# Patient Record
Sex: MALE | Race: WHITE | NOT HISPANIC OR LATINO
[De-identification: names, ages, dates, MRNs, and addresses within clinical notes are randomized per-mention and may not be internally consistent; named-entity substitution may affect disease eponyms.]

---

## 2019-06-20 PROBLEM — Z00.00 ENCOUNTER FOR PREVENTIVE HEALTH EXAMINATION: Status: ACTIVE | Noted: 2019-06-20

## 2020-02-19 ENCOUNTER — APPOINTMENT (OUTPATIENT)
Dept: DERMATOLOGY | Facility: CLINIC | Age: 60
End: 2020-02-19
Payer: COMMERCIAL

## 2020-02-19 PROCEDURE — 99203 OFFICE O/P NEW LOW 30 MIN: CPT

## 2021-02-24 ENCOUNTER — APPOINTMENT (OUTPATIENT)
Dept: DERMATOLOGY | Facility: CLINIC | Age: 61
End: 2021-02-24

## 2021-08-12 ENCOUNTER — APPOINTMENT (OUTPATIENT)
Dept: DERMATOLOGY | Facility: CLINIC | Age: 61
End: 2021-08-12
Payer: COMMERCIAL

## 2021-08-12 PROCEDURE — 99213 OFFICE O/P EST LOW 20 MIN: CPT

## 2022-06-19 ENCOUNTER — OUTPATIENT (OUTPATIENT)
Dept: OUTPATIENT SERVICES | Facility: HOSPITAL | Age: 62
LOS: 1 days | End: 2022-06-19
Payer: COMMERCIAL

## 2022-06-19 DIAGNOSIS — Z11.52 ENCOUNTER FOR SCREENING FOR COVID-19: ICD-10-CM

## 2022-06-19 LAB — SARS-COV-2 RNA SPEC QL NAA+PROBE: SIGNIFICANT CHANGE UP

## 2022-06-19 PROCEDURE — U0005: CPT

## 2022-06-19 PROCEDURE — C9803: CPT

## 2022-06-19 PROCEDURE — U0003: CPT

## 2022-06-22 ENCOUNTER — OUTPATIENT (OUTPATIENT)
Dept: OUTPATIENT SERVICES | Facility: HOSPITAL | Age: 62
LOS: 1 days | End: 2022-06-22
Payer: COMMERCIAL

## 2022-06-22 ENCOUNTER — TRANSCRIPTION ENCOUNTER (OUTPATIENT)
Age: 62
End: 2022-06-22

## 2022-06-22 VITALS
OXYGEN SATURATION: 97 % | DIASTOLIC BLOOD PRESSURE: 81 MMHG | RESPIRATION RATE: 18 BRPM | HEART RATE: 48 BPM | TEMPERATURE: 98 F | WEIGHT: 186.07 LBS | SYSTOLIC BLOOD PRESSURE: 138 MMHG | HEIGHT: 68 IN

## 2022-06-22 VITALS — TEMPERATURE: 98 F

## 2022-06-22 DIAGNOSIS — I25.10 ATHEROSCLEROTIC HEART DISEASE OF NATIVE CORONARY ARTERY WITHOUT ANGINA PECTORIS: ICD-10-CM

## 2022-06-22 DIAGNOSIS — Z95.5 PRESENCE OF CORONARY ANGIOPLASTY IMPLANT AND GRAFT: Chronic | ICD-10-CM

## 2022-06-22 LAB
ANION GAP SERPL CALC-SCNC: 11 MMOL/L — SIGNIFICANT CHANGE UP (ref 5–17)
BUN SERPL-MCNC: 22 MG/DL — SIGNIFICANT CHANGE UP (ref 7–23)
CALCIUM SERPL-MCNC: 10 MG/DL — SIGNIFICANT CHANGE UP (ref 8.4–10.5)
CHLORIDE SERPL-SCNC: 104 MMOL/L — SIGNIFICANT CHANGE UP (ref 96–108)
CO2 SERPL-SCNC: 27 MMOL/L — SIGNIFICANT CHANGE UP (ref 22–31)
CREAT SERPL-MCNC: 1.06 MG/DL — SIGNIFICANT CHANGE UP (ref 0.5–1.3)
EGFR: 80 ML/MIN/1.73M2 — SIGNIFICANT CHANGE UP
GLUCOSE SERPL-MCNC: 108 MG/DL — HIGH (ref 70–99)
HCT VFR BLD CALC: 42.8 % — SIGNIFICANT CHANGE UP (ref 39–50)
HGB BLD-MCNC: 14.5 G/DL — SIGNIFICANT CHANGE UP (ref 13–17)
MCHC RBC-ENTMCNC: 30.2 PG — SIGNIFICANT CHANGE UP (ref 27–34)
MCHC RBC-ENTMCNC: 33.9 GM/DL — SIGNIFICANT CHANGE UP (ref 32–36)
MCV RBC AUTO: 89.2 FL — SIGNIFICANT CHANGE UP (ref 80–100)
NRBC # BLD: 0 /100 WBCS — SIGNIFICANT CHANGE UP (ref 0–0)
PLATELET # BLD AUTO: 262 K/UL — SIGNIFICANT CHANGE UP (ref 150–400)
POTASSIUM SERPL-MCNC: 4.3 MMOL/L — SIGNIFICANT CHANGE UP (ref 3.5–5.3)
POTASSIUM SERPL-SCNC: 4.3 MMOL/L — SIGNIFICANT CHANGE UP (ref 3.5–5.3)
RBC # BLD: 4.8 M/UL — SIGNIFICANT CHANGE UP (ref 4.2–5.8)
RBC # FLD: 12.4 % — SIGNIFICANT CHANGE UP (ref 10.3–14.5)
SODIUM SERPL-SCNC: 142 MMOL/L — SIGNIFICANT CHANGE UP (ref 135–145)
WBC # BLD: 4.71 K/UL — SIGNIFICANT CHANGE UP (ref 3.8–10.5)
WBC # FLD AUTO: 4.71 K/UL — SIGNIFICANT CHANGE UP (ref 3.8–10.5)

## 2022-06-22 PROCEDURE — 77770 HDR RDNCL NTRSTL/ICAV BRCHTX: CPT

## 2022-06-22 PROCEDURE — 92974 CATH PLACE CARDIO BRACHYTX: CPT

## 2022-06-22 PROCEDURE — 93005 ELECTROCARDIOGRAM TRACING: CPT

## 2022-06-22 PROCEDURE — 93010 ELECTROCARDIOGRAM REPORT: CPT

## 2022-06-22 PROCEDURE — 77470 SPECIAL RADIATION TREATMENT: CPT | Mod: 26

## 2022-06-22 PROCEDURE — 85027 COMPLETE CBC AUTOMATED: CPT

## 2022-06-22 PROCEDURE — 92920 PRQ TRLUML C ANGIOP 1ART&/BR: CPT | Mod: RC

## 2022-06-22 PROCEDURE — 99221 1ST HOSP IP/OBS SF/LOW 40: CPT | Mod: 25

## 2022-06-22 PROCEDURE — 77470 SPECIAL RADIATION TREATMENT: CPT

## 2022-06-22 PROCEDURE — 77290 THER RAD SIMULAJ FIELD CPLX: CPT

## 2022-06-22 PROCEDURE — C1717: CPT

## 2022-06-22 PROCEDURE — C1887: CPT

## 2022-06-22 PROCEDURE — C1728: CPT

## 2022-06-22 PROCEDURE — 77770 HDR RDNCL NTRSTL/ICAV BRCHTX: CPT | Mod: 26

## 2022-06-22 PROCEDURE — 77290 THER RAD SIMULAJ FIELD CPLX: CPT | Mod: 26

## 2022-06-22 PROCEDURE — C1769: CPT

## 2022-06-22 PROCEDURE — 77263 THER RADIOLOGY TX PLNG CPLX: CPT

## 2022-06-22 PROCEDURE — 93010 ELECTROCARDIOGRAM REPORT: CPT | Mod: 77

## 2022-06-22 PROCEDURE — C1894: CPT

## 2022-06-22 PROCEDURE — 80048 BASIC METABOLIC PNL TOTAL CA: CPT

## 2022-06-22 PROCEDURE — 77316 BRACHYTX ISODOSE PLAN SIMPLE: CPT

## 2022-06-22 PROCEDURE — 77316 BRACHYTX ISODOSE PLAN SIMPLE: CPT | Mod: 26

## 2022-06-22 PROCEDURE — C1725: CPT

## 2022-06-22 PROCEDURE — 77370 RADIATION PHYSICS CONSULT: CPT

## 2022-06-22 RX ORDER — SODIUM CHLORIDE 9 MG/ML
1000 INJECTION INTRAMUSCULAR; INTRAVENOUS; SUBCUTANEOUS
Refills: 0 | Status: DISCONTINUED | OUTPATIENT
Start: 2022-06-22 | End: 2022-07-06

## 2022-06-22 RX ORDER — SODIUM CHLORIDE 9 MG/ML
250 INJECTION INTRAMUSCULAR; INTRAVENOUS; SUBCUTANEOUS ONCE
Refills: 0 | Status: COMPLETED | OUTPATIENT
Start: 2022-06-22 | End: 2022-06-22

## 2022-06-22 RX ADMIN — SODIUM CHLORIDE 85 MILLILITER(S): 9 INJECTION INTRAMUSCULAR; INTRAVENOUS; SUBCUTANEOUS at 10:45

## 2022-06-22 RX ADMIN — SODIUM CHLORIDE 500 MILLILITER(S): 9 INJECTION INTRAMUSCULAR; INTRAVENOUS; SUBCUTANEOUS at 08:29

## 2022-06-22 NOTE — H&P CARDIOLOGY - HISTORY OF PRESENT ILLNESS
This is a 62 yo male PMH former smoker, HTN, HLD, CAD s/p multiple TRIEC (most recent 5/23/22, who presents for brachytherapy.  Denies chest pain/pressure, SOB/MARY, dizziness, diaphoresis, palpitations, nausea, vomiting, peripheral edema, recent weight gain, or syncope. No implanted monitoring devices.  This is a 60 yo male PMH former smoker, HTN, HLD, CAD s/p multiple TRICE (most recent 5/23/22, ROCHELLE on CPAP who presents for brachytherapy. Pt. endorses episodes of nonradiating chest pain with SOB/MARY prior to last PCI that have resolved with stent placement. Denies dizziness, diaphoresis, palpitations, nausea, vomiting, peripheral edema, recent weight gain, or syncope. No implanted monitoring devices.

## 2022-06-22 NOTE — ASU DISCHARGE PLAN (ADULT/PEDIATRIC) - NS MD DC FALL RISK RISK
For information on Fall & Injury Prevention, visit: [email or web address] OR  [email or web address] OR  https://www.cdc.gov/steadi/patient.html

## 2022-06-22 NOTE — ASU PATIENT PROFILE, ADULT - FALL HARM RISK - UNIVERSAL INTERVENTIONS
Bed in lowest position, wheels locked, appropriate side rails in place/Call bell, personal items and telephone in reach/Instruct patient to call for assistance before getting out of bed or chair/Non-slip footwear when patient is out of bed/[place] to call system/Physically safe environment - no spills, clutter or unnecessary equipment/Purposeful Proactive Rounding/Room/bathroom lighting operational, light cord in reach

## 2022-06-22 NOTE — ASU DISCHARGE PLAN (ADULT/PEDIATRIC) - CARE PROVIDER_API CALL
Soren Dick)  Cardiovascular Disease; Interventional Cardiology  [address] [place] [address]  Phone: [phone]  Fax: [phone]  Follow Up Time: 2 weeks

## 2022-06-22 NOTE — H&P CARDIOLOGY - NSICDXPASTMEDICALHX_GEN_ALL_CORE_FT
PAST MEDICAL HISTORY:  CAD (coronary artery disease)     HLD (hyperlipidemia)     HTN (hypertension)      PAST MEDICAL HISTORY:  CAD (coronary artery disease)     HLD (hyperlipidemia)     HTN (hypertension)     ROCHELLE on CPAP

## 2022-06-23 ENCOUNTER — TRANSCRIPTION ENCOUNTER (OUTPATIENT)
Age: 62
End: 2022-06-23

## 2022-08-17 ENCOUNTER — APPOINTMENT (OUTPATIENT)
Dept: DERMATOLOGY | Facility: CLINIC | Age: 62
End: 2022-08-17

## 2022-08-17 PROBLEM — G47.33 OBSTRUCTIVE SLEEP APNEA (ADULT) (PEDIATRIC): Chronic | Status: ACTIVE | Noted: 2022-06-22

## 2022-08-17 PROBLEM — E78.5 HYPERLIPIDEMIA, UNSPECIFIED: Chronic | Status: ACTIVE | Noted: 2022-06-22

## 2022-08-17 PROBLEM — I25.10 ATHEROSCLEROTIC HEART DISEASE OF NATIVE CORONARY ARTERY WITHOUT ANGINA PECTORIS: Chronic | Status: ACTIVE | Noted: 2022-06-22

## 2022-08-17 PROBLEM — I10 ESSENTIAL (PRIMARY) HYPERTENSION: Chronic | Status: ACTIVE | Noted: 2022-06-22

## 2022-08-17 PROCEDURE — 99213 OFFICE O/P EST LOW 20 MIN: CPT

## 2022-10-10 ENCOUNTER — APPOINTMENT (OUTPATIENT)
Dept: CT IMAGING | Facility: CLINIC | Age: 62
End: 2022-10-10

## 2022-10-10 ENCOUNTER — OUTPATIENT (OUTPATIENT)
Dept: OUTPATIENT SERVICES | Facility: HOSPITAL | Age: 62
LOS: 1 days | End: 2022-10-10
Payer: COMMERCIAL

## 2022-10-10 DIAGNOSIS — Z00.8 ENCOUNTER FOR OTHER GENERAL EXAMINATION: ICD-10-CM

## 2022-10-10 DIAGNOSIS — I25.10 ATHEROSCLEROTIC HEART DISEASE OF NATIVE CORONARY ARTERY WITHOUT ANGINA PECTORIS: ICD-10-CM

## 2022-10-10 DIAGNOSIS — Z95.5 PRESENCE OF CORONARY ANGIOPLASTY IMPLANT AND GRAFT: Chronic | ICD-10-CM

## 2022-10-10 PROCEDURE — 75574 CT ANGIO HRT W/3D IMAGE: CPT | Mod: 26

## 2022-10-10 PROCEDURE — 75574 CT ANGIO HRT W/3D IMAGE: CPT

## 2022-10-31 ENCOUNTER — OUTPATIENT (OUTPATIENT)
Dept: OUTPATIENT SERVICES | Facility: HOSPITAL | Age: 62
LOS: 1 days | End: 2022-10-31
Payer: COMMERCIAL

## 2022-10-31 ENCOUNTER — APPOINTMENT (OUTPATIENT)
Dept: CARDIOTHORACIC SURGERY | Facility: CLINIC | Age: 62
End: 2022-10-31

## 2022-10-31 ENCOUNTER — NON-APPOINTMENT (OUTPATIENT)
Age: 62
End: 2022-10-31

## 2022-10-31 VITALS
BODY MASS INDEX: 28.14 KG/M2 | TEMPERATURE: 98 F | OXYGEN SATURATION: 96 % | SYSTOLIC BLOOD PRESSURE: 160 MMHG | DIASTOLIC BLOOD PRESSURE: 88 MMHG | WEIGHT: 190 LBS | RESPIRATION RATE: 16 BRPM | HEIGHT: 69 IN | HEART RATE: 58 BPM

## 2022-10-31 VITALS
HEIGHT: 69 IN | TEMPERATURE: 98 F | DIASTOLIC BLOOD PRESSURE: 88 MMHG | HEART RATE: 58 BPM | SYSTOLIC BLOOD PRESSURE: 160 MMHG | RESPIRATION RATE: 16 BRPM | WEIGHT: 190.04 LBS | OXYGEN SATURATION: 96 %

## 2022-10-31 DIAGNOSIS — G47.33 OBSTRUCTIVE SLEEP APNEA (ADULT) (PEDIATRIC): ICD-10-CM

## 2022-10-31 DIAGNOSIS — I10 ESSENTIAL (PRIMARY) HYPERTENSION: ICD-10-CM

## 2022-10-31 DIAGNOSIS — I25.10 ATHEROSCLEROTIC HEART DISEASE OF NATIVE CORONARY ARTERY W/OUT ANGINA PECTORIS: ICD-10-CM

## 2022-10-31 DIAGNOSIS — E78.5 HYPERLIPIDEMIA, UNSPECIFIED: ICD-10-CM

## 2022-10-31 DIAGNOSIS — Z95.5 PRESENCE OF CORONARY ANGIOPLASTY IMPLANT AND GRAFT: Chronic | ICD-10-CM

## 2022-10-31 DIAGNOSIS — Z99.89 OBSTRUCTIVE SLEEP APNEA (ADULT) (PEDIATRIC): ICD-10-CM

## 2022-10-31 DIAGNOSIS — I25.10 ATHEROSCLEROTIC HEART DISEASE OF NATIVE CORONARY ARTERY WITHOUT ANGINA PECTORIS: ICD-10-CM

## 2022-10-31 PROBLEM — Z82.49 FAMILY HISTORY OF CORONARY ARTERY DISEASE: Status: ACTIVE | Noted: 2022-10-31

## 2022-10-31 PROBLEM — Z87.891 FORMER SMOKER: Status: ACTIVE | Noted: 2022-10-31

## 2022-10-31 PROBLEM — Z78.9 SOCIAL ALCOHOL USE: Status: ACTIVE | Noted: 2022-10-31

## 2022-10-31 LAB
ANION GAP SERPL CALC-SCNC: 12 MMOL/L — SIGNIFICANT CHANGE UP (ref 5–17)
BLD GP AB SCN SERPL QL: NEGATIVE — SIGNIFICANT CHANGE UP
BUN SERPL-MCNC: 16 MG/DL — SIGNIFICANT CHANGE UP (ref 7–23)
CALCIUM SERPL-MCNC: 9.7 MG/DL — SIGNIFICANT CHANGE UP (ref 8.4–10.5)
CHLORIDE SERPL-SCNC: 107 MMOL/L — SIGNIFICANT CHANGE UP (ref 96–108)
CO2 SERPL-SCNC: 24 MMOL/L — SIGNIFICANT CHANGE UP (ref 22–31)
CREAT SERPL-MCNC: 0.96 MG/DL — SIGNIFICANT CHANGE UP (ref 0.5–1.3)
EGFR: 89 ML/MIN/1.73M2 — SIGNIFICANT CHANGE UP
GLUCOSE SERPL-MCNC: 89 MG/DL — SIGNIFICANT CHANGE UP (ref 70–99)
HCT VFR BLD CALC: 43.4 % — SIGNIFICANT CHANGE UP (ref 39–50)
HGB BLD-MCNC: 14.6 G/DL — SIGNIFICANT CHANGE UP (ref 13–17)
MCHC RBC-ENTMCNC: 30 PG — SIGNIFICANT CHANGE UP (ref 27–34)
MCHC RBC-ENTMCNC: 33.6 GM/DL — SIGNIFICANT CHANGE UP (ref 32–36)
MCV RBC AUTO: 89.3 FL — SIGNIFICANT CHANGE UP (ref 80–100)
NRBC # BLD: 0 /100 WBCS — SIGNIFICANT CHANGE UP (ref 0–0)
PA ADP PRP-ACNC: 103 PRU — LOW (ref 194–417)
PLATELET # BLD AUTO: 239 K/UL — SIGNIFICANT CHANGE UP (ref 150–400)
POTASSIUM SERPL-MCNC: 4.3 MMOL/L — SIGNIFICANT CHANGE UP (ref 3.5–5.3)
POTASSIUM SERPL-SCNC: 4.3 MMOL/L — SIGNIFICANT CHANGE UP (ref 3.5–5.3)
RBC # BLD: 4.86 M/UL — SIGNIFICANT CHANGE UP (ref 4.2–5.8)
RBC # FLD: 12.2 % — SIGNIFICANT CHANGE UP (ref 10.3–14.5)
RH IG SCN BLD-IMP: NEGATIVE — SIGNIFICANT CHANGE UP
SARS-COV-2 RNA SPEC QL NAA+PROBE: SIGNIFICANT CHANGE UP
SODIUM SERPL-SCNC: 143 MMOL/L — SIGNIFICANT CHANGE UP (ref 135–145)
WBC # BLD: 5.38 K/UL — SIGNIFICANT CHANGE UP (ref 3.8–10.5)
WBC # FLD AUTO: 5.38 K/UL — SIGNIFICANT CHANGE UP (ref 3.8–10.5)

## 2022-10-31 PROCEDURE — 86850 RBC ANTIBODY SCREEN: CPT

## 2022-10-31 PROCEDURE — 86901 BLOOD TYPING SEROLOGIC RH(D): CPT

## 2022-10-31 PROCEDURE — 99244 OFF/OP CNSLTJ NEW/EST MOD 40: CPT

## 2022-10-31 PROCEDURE — 93880 EXTRACRANIAL BILAT STUDY: CPT

## 2022-10-31 PROCEDURE — G0463: CPT

## 2022-10-31 PROCEDURE — 71046 X-RAY EXAM CHEST 2 VIEWS: CPT | Mod: 26

## 2022-10-31 PROCEDURE — 86900 BLOOD TYPING SEROLOGIC ABO: CPT

## 2022-10-31 PROCEDURE — 93880 EXTRACRANIAL BILAT STUDY: CPT | Mod: 26

## 2022-10-31 PROCEDURE — 71046 X-RAY EXAM CHEST 2 VIEWS: CPT

## 2022-10-31 RX ORDER — CEFAZOLIN SODIUM 1 G
2000 VIAL (EA) INJECTION ONCE
Refills: 0 | Status: COMPLETED | OUTPATIENT
Start: 2022-10-31 | End: 2022-10-31

## 2022-10-31 RX ORDER — ROSUVASTATIN CALCIUM 40 MG/1
40 TABLET, FILM COATED ORAL
Qty: 30 | Refills: 5 | Status: ACTIVE | COMMUNITY

## 2022-10-31 RX ORDER — LIDOCAINE HCL 20 MG/ML
0.2 VIAL (ML) INJECTION ONCE
Refills: 0 | Status: DISCONTINUED | OUTPATIENT
Start: 2022-10-31 | End: 2022-11-15

## 2022-10-31 RX ORDER — ICOSAPENT ETHYL 1000 MG/1
1 CAPSULE ORAL
Qty: 360 | Refills: 3 | Status: ACTIVE | COMMUNITY

## 2022-10-31 RX ORDER — BEMPEDOIC ACID 180 MG/1
180 TABLET, FILM COATED ORAL DAILY
Refills: 0 | Status: ACTIVE | COMMUNITY

## 2022-10-31 RX ORDER — SODIUM CHLORIDE 9 MG/ML
3 INJECTION INTRAMUSCULAR; INTRAVENOUS; SUBCUTANEOUS EVERY 8 HOURS
Refills: 0 | Status: DISCONTINUED | OUTPATIENT
Start: 2022-10-31 | End: 2022-11-15

## 2022-10-31 RX ORDER — ACETAMINOPHEN 500 MG
1000 TABLET ORAL ONCE
Refills: 0 | Status: DISCONTINUED | OUTPATIENT
Start: 2022-10-31 | End: 2022-11-15

## 2022-10-31 RX ORDER — ASPIRIN ENTERIC COATED TABLETS 81 MG 81 MG/1
81 TABLET, DELAYED RELEASE ORAL
Qty: 30 | Refills: 0 | Status: ACTIVE | COMMUNITY

## 2022-10-31 NOTE — H&P PST ADULT - HISTORY OF PRESENT ILLNESS
Mr. SARAVIA is a 62 year old male with past medical history of former smoker, HTN, HLD, Low back pain, CAD s/p multiple TRICE (most recent 5/23/22, Had brachytherapy to mid RCA on 6/22/22 ), ROCHELLE on CPAP with complaints of chest pain radiating to bilateral shoulder, occasional shortness of breath and occasional dizziness . Pt. endorses episodes of nonradiating chest pain with SOB/MARY prior to last PCI that have resolved with stent placement but did not feel better after the brachytherapy . He reports that his chest pain is with activities and needs to take rest to resolve the pain. His last chest pain was last week pretty much every day with less intensity. His recent 10/28/22 Cardiac Catheterization revealed LVEF 55%, LT heart catheterization demonstrated severe in stent restenosis of the LAD and the diagonal branches. LAD 90% stenosis and previously placed stent is a drug-eluting stent and is partially occluded. First diagonal 90% stenosis and previously placed stent is a drug-eluting stent and is partially occluded. There is mild non obstructive disease of the LCx. The RCA has moderate in stent restenosis of the mid RCA which recently had brachytherapy. There is 60% stenosis. He is referred by Dr.Ralph Keys with initial evaluation and management for CAD. He is scheduled for coronary Artery Bypass grafting with Dr. Danny Alvarado on 11/3/22.

## 2022-10-31 NOTE — H&P PST ADULT - NSICDXPASTMEDICALHX_GEN_ALL_CORE_FT
PAST MEDICAL HISTORY:  CAD (coronary artery disease)     HLD (hyperlipidemia)     HTN (hypertension)     ROCHELLE on CPAP

## 2022-10-31 NOTE — END OF VISIT
[FreeTextEntry3] : \par I personally scribed for AMINTA SWEET on Oct 31 2022 12:30PM . \par \par \par \par \par Physician Attestation:\par Documented by ALISON MONTES acting as a scribe for AMINTA SWEET 10/31/2022 . \par                 All medical record entries made by the Scribe were at my, AMINTA SWEET , direction and personally dictated by me on 10/31/2022 . I have reviewed the chart and agree that the record accurately reflects my personal performance of the history, physical exam, assessment and plan\par \par

## 2022-10-31 NOTE — PHYSICAL EXAM
[General Appearance - Alert] : alert [General Appearance - In No Acute Distress] : in no acute distress [General Appearance - Well Nourished] : well nourished [General Appearance - Well Developed] : well developed [Sclera] : the sclera and conjunctiva were normal [Outer Ear] : the ears and nose were normal in appearance [PERRL With Normal Accommodation] : pupils were equal in size, round, and reactive to light [Both Tympanic Membranes Were Examined] : both tympanic membranes were normal [Neck Appearance] : the appearance of the neck was normal [] : no respiratory distress [Respiration, Rhythm And Depth] : normal respiratory rhythm and effort [Auscultation Breath Sounds / Voice Sounds] : lungs were clear to auscultation bilaterally [Apical Impulse] : the apical impulse was normal [Heart Rate And Rhythm] : heart rate was normal and rhythm regular [Heart Sounds] : normal S1 and S2 [Murmurs] : no murmurs [Examination Of The Chest] : the chest was normal in appearance [2+] : left 2+ [Breast Appearance] : normal in appearance [Bowel Sounds] : normal bowel sounds [Abdomen Soft] : soft [No CVA Tenderness] : no ~M costovertebral angle tenderness [Abnormal Walk] : normal gait [Involuntary Movements] : no involuntary movements were seen [Skin Color & Pigmentation] : normal skin color and pigmentation [Skin Turgor] : normal skin turgor [No Focal Deficits] : no focal deficits [Oriented To Time, Place, And Person] : oriented to person, place, and time [Impaired Insight] : insight and judgment were intact [Affect] : the affect was normal [Mood] : the mood was normal [Memory Recent] : recent memory was not impaired [Memory Remote] : remote memory was not impaired [FreeTextEntry1] : Deferred

## 2022-10-31 NOTE — H&P PST ADULT - ASSESSMENT
Mr. SARAVIA is a 62 year old male with past medical history of former smoker, HTN, HLD, Low back pain, CAD s/p multiple TRICE (most recent 5/23/22, Had brachytherapy to mid RCA on 6/22/22 ), ROCHELLE on CPAP with complaints of chest pain radiating to bilateral shoulder, occasional shortness of breath and occasional dizziness . Pt. endorses episodes of nonradiating chest pain with SOB/MARY prior to last PCI that have resolved with stent placement but did not feel better after the brachytherapy . He reports that his chest pain is with activities and needs to take rest to resolve the pain. His last chest pain was last week pretty much every day with less intensity. His recent 10/28/22 Cardiac Catheterization revealed LVEF 55%, LT heart catheterization demonstrated severe in stent restenosis of the LAD and the diagonal branches. LAD 90% stenosis and previously placed stent is a drug-eluting stent and is partially occluded. First diagonal 90% stenosis and previously placed stent is a drug-eluting stent and is partially occluded. There is mild non obstructive disease of the LCx. The RCA has moderate in stent restenosis of the mid RCA which recently had brachytherapy. There is 60% stenosis.

## 2022-10-31 NOTE — HISTORY OF PRESENT ILLNESS
[FreeTextEntry1] : Mr. SARAVIA is a 62 year old male with  past medical history of former smoker, HTN, HLD, Low back pain, CAD s/p multiple TRICE (most recent 22, Had brachytherapy to mid RCA on 22  ), ROCHELLE on CPAP with complaints of chest pain radiating to bilateral shoulder, occasional shortness of breath and occasional dizziness .  Pt. endorses episodes of nonradiating chest pain with SOB/MARY prior to last PCI that have resolved with stent placement but did not feel better after the brachytherapy . He reports that his chest pain is with activities and needs to take rest to resolve the pain. His last chest pain was last week pretty much every day with less intensity. His recent 10/28/22 Cardiac Catheterization revealed LVEF 55%, LT heart catheterization demonstrated severe in stent restenosis of the LAD and the diagonal branches. LAD 90% stenosis and previously placed stent is a drug-eluting stent and is partially occluded. First diagonal 90% stenosis and previously placed stent is a drug-eluting stent and is partially occluded.  There is mild non obstructive disease of the LCx. The RCA has moderate in stent restenosis of the mid RCA which recently had brachytherapy.  There is 60% stenosis. He is referred by Dr.Ralph Keys with initial evaluation and management for CAD. Denies any syncope, pedal edema. \par \par Family history : Father  in sleep unknown reason at the age of 65 ? heart attack \par \par \par

## 2022-10-31 NOTE — CONSULT LETTER
[FreeTextEntry2] : Dr.Ralph Keys [FreeTextEntry1] : I had the pleasure of seeing your patient, RENETTA SARAVIA,in my office today. \par \par We take a multidisciplinary team approach to patient care and consider you, the referring physician, an extension of our team. We will maintain an open line of communication with you throughout your patient's treatment course. \par \par He  is being evaluated for CAD . I have reviewed all of the patient's medical records and diagnostic images at the time of his  office consultation. I have enclosed a copy for your records. \par \par 10/28/22 Cardiac Catheterization revealed LVEF 55%, LT heart catheterization demonstrated severe in stent restenosis of the LAD and the diagonal branches. LAD 90% stenosis and previously placed stent is a drug-eluting stent and is partially occluded. First diagonal 90% stenosis and previously placed stent is a drug-eluting stent and is partially occluded.  There is mild non obstructive disease of the LCx. The RCA has moderate in stent restenosis of the mid RCA which recently had brachytherapy.  There is 60% stenosis. \par \par 10/10/22 CCTA revealed The LM coronary artery has mild (<30%) calcified plaque. Focal region of low attenuation noted at the origin of the stent in the proximal LAD compatible with severe in-stent restenosis. The LAD has mild (30-49%) noncalcified plaque in the mid segment and mild (30-49%) noncalcified and calcified plaque in the distal segment. Myocardial bridging noted in the mid vessel. The distal LAD wraps around the left ventricular apex. Calcified plaque is present in a very small-caliber first diagonal branch. Severe in-stent restenosis noted in the stented (proximal vessel) second diagonal branch. Severe mixed noncalcified and calcified plaque is present in the second diagonal branch just distal to the stent. Distal to the obstructive plaque in the second diagonal branch no significant luminal stenosis noted.\par - The LCX coronary artery has mild (30-49%) noncalcified and calcified plaque in the proximal segment. The stent extending from the proximal segment to the obtuse marginal (OM) branch appears patent. The distal LCX has minimal (<30%) noncalcified and calcified plaque. The OM branch distal to the stent has minimal (<30%) noncalcified plaque.\par - The long stent extending from the proximal RCA to the distal segment appears patent. The distal vessel (distal to the stent) has mild (30-49%) noncalcified and calcified plaque. The small right posterior descending artery (PDA) has minimal (<30%) noncalcified plaque. A small right posterolateral (RPL) branch is present.\par - Aorta:Minimal noncalcified and calcified plaque is present in the visualized thoracic aorta.There is a suspected patent foramen ovale with a very small volume of left-to-right flow of contrast agent.\par - Non-cardiac: Few very small lymph nodes are present in the hilar regions bilaterally. Patchy ground glass opacities in the posterior aspects of both lower lobes represent dependent atelectasis. Visualized osseous structures demonstrate degenerative changes of the spine.\par \par 6/22/22 Cardiac catheterization revealed  Mid right coronary artery: There is a 70 % stenosis.  \par Interventional Findings: \par A successful Balloon angioplasty was performed . A successful Beta Brachytherapy was performed. \par \par I have reviewed the indications for surgery and anatomy of the heart. The patient meets criteria for surgery. I have recommended that the patient is a candidate for a Coronary Artery Bypass grafting . \par \par Surgery is scheduled for Coronary Artery Bypass grafting on 11/3/22 .  I will update you on his perioperative status and disposition upon discharge. \par \par I appreciate the opportunity to care for your patient at the  [facility] based at [place]. If there are any questions or concerns, please call me  at [phone].\par \par Please see my note below. \par \par Sincerely, \par \par \par \par \par Danny Alvarado MD\par Director\par [facility]\par Professor \par Cardiovascular & Thoracic Surgery\par [facility]\[employer].\par \par \par [facility]:\par Department of Cardiovascular and Thoracic Surgery\[address], [address]\par Office: [phone]\par Fax: [phone]\par \par Shannen Schwartz\par  \par Department of Cardiovascular and Thoracic Surgery\[address], [address]\par Phone: [phone]\par Office: [phone]\par \par \par \par \par \par

## 2022-10-31 NOTE — H&P PST ADULT - FALL HARM RISK - UNIVERSAL INTERVENTIONS
Bed in lowest position, wheels locked, appropriate side rails in place/Call bell, personal items and telephone in reach/Instruct patient to call for assistance before getting out of bed or chair/Non-slip footwear when patient is out of bed/[place] to call system/Physically safe environment - no spills, clutter or unnecessary equipment/Purposeful Proactive Rounding/Room/bathroom lighting operational, light cord in reach

## 2022-10-31 NOTE — H&P PST ADULT - PROBLEM SELECTOR PLAN 1
For CABG on 11/3/22 with Dr. Danny Alvarado    BB: On Bystolic 2.5 mg on the AM surgery    Stop Plavix today    Continue Aspirin

## 2022-10-31 NOTE — REVIEW OF SYSTEMS
[Chest Pain] : chest pain [Shortness Of Breath] : shortness of breath [SOB on Exertion] : shortness of breath during exertion [Dizziness] : dizziness [Negative] : Heme/Lymph

## 2022-10-31 NOTE — ASSESSMENT
[FreeTextEntry1] : Mr. SARAVIA is a 62 year old male with  past medical history of former smoker, HTN, HLD, Low back pain, CAD s/p multiple TRICE (most recent 5/23/22, Had brachytherapy to mid RCA on 6/22/22  ), ROCHELLE on CPAP with complaints of chest pain radiating to bilateral shoulder, occasional shortness of breath and occasional dizziness .  Pt. endorses episodes of nonradiating chest pain with SOB/MARY prior to last PCI that have resolved with stent placement but did not feel better after the brachytherapy . He reports that his chest pain is with activities and needs to take rest to resolve the pain. His last chest pain was last week pretty much every day with less intensity. His recent 10/28/22 Cardiac Catheterization revealed LVEF 55%, LT heart catheterization demonstrated severe in stent restenosis of the LAD and the diagonal branches. LAD 90% stenosis and previously placed stent is a drug-eluting stent and is partially occluded. First diagonal 90% stenosis and previously placed stent is a drug-eluting stent and is partially occluded.  There is mild non obstructive disease of the LCx. The RCA has moderate in stent restenosis of the mid RCA which recently had brachytherapy.  There is 60% stenosis. He is referred by Dr.Ralph Keys with initial evaluation and management for CAD. Denies any syncope, pedal edema. \par \par \par  Dr.James Alvarado reviewed the cardiac imaging, medical records and reports with patient and discussed the case.10/28/22 Cardiac Catheterization revealed LVEF 55%, LT heart catheterization demonstrated severe in stent restenosis of the LAD and the diagonal branches. LAD 90% stenosis and previously placed stent is a drug-eluting stent and is partially occluded. First diagonal 90% stenosis and previously placed stent is a drug-eluting stent and is partially occluded.  There is mild non obstructive disease of the LCx. The RCA has moderate in stent restenosis of the mid RCA which recently had brachytherapy.  There is 60% stenosis. \par \par 10/10/22 CCTA revealed The LM coronary artery has mild (<30%) calcified plaque. Focal region of low attenuation noted at the origin of the stent in the proximal LAD compatible with severe in-stent restenosis. The LAD has mild (30-49%) noncalcified plaque in the mid segment and mild (30-49%) noncalcified and calcified plaque in the distal segment. Myocardial bridging noted in the mid vessel. The distal LAD wraps around the left ventricular apex. Calcified plaque is present in a very small-caliber first diagonal branch. Severe in-stent restenosis noted in the stented (proximal vessel) second diagonal branch. Severe mixed noncalcified and calcified plaque is present in the second diagonal branch just distal to the stent. Distal to the obstructive plaque in the second diagonal branch no significant luminal stenosis noted.\par - The LCX coronary artery has mild (30-49%) noncalcified and calcified plaque in the proximal segment. The stent extending from the proximal segment to the obtuse marginal (OM) branch appears patent. The distal LCX has minimal (<30%) noncalcified and calcified plaque. The OM branch distal to the stent has minimal (<30%) noncalcified plaque.\par - The long stent extending from the proximal RCA to the distal segment appears patent. The distal vessel (distal to the stent) has mild (30-49%) noncalcified and calcified plaque. The small right posterior descending artery (PDA) has minimal (<30%) noncalcified plaque. A small right posterolateral (RPL) branch is present.\par - Aorta:Minimal noncalcified and calcified plaque is present in the visualized thoracic aorta.There is a suspected patent foramen ovale with a very small volume of left-to-right flow of contrast agent.\par - Non-cardiac: Few very small lymph nodes are present in the hilar regions bilaterally. Patchy ground glass opacities in the posterior aspects of both lower lobes represent dependent atelectasis. Visualized osseous structures demonstrate degenerative changes of the spine.\par \par 6/22/22 Cardiac catheterization revealed  Mid right coronary artery: There is a 70 % stenosis.  \par Interventional Findings: \par A successful Balloon angioplasty was performed . A successful Beta Brachytherapy was performed. \par \par    Dr.James Alvarado discussed the risks , benefits and alternatives to surgery. Risks included but not limited to  bleeding , stroke, Myocardial Infarction, kidney problems,Blood transfusion ,permanent  pacemaker implantation,  infections and death.  Dr.James Alvarado quoted a (low ) operative mortality and complication risks. Dr.James Alvarado also discussed the various approaches in detail. Dr.James Alvarado feel that the patient will benefit and is a candidate for a Coronary Artery Bypass grafting  . All questions and concerns were addressed and patient agrees to proceed with  surgery on 11/3/22.\par \par \par Plan:\par 1) Coronary Artery Bypass grafting on 11/3/22 \par 2) PST \par 3) Stop Plavix today and continue Aspirin \par 4) May return to clinic on PRN basis \par \par ADDENDUM--62 yr old male with coronary stent restenosis and symptoms of exertional chest discomfort and dyspnea.  Plan CABG on 11/3/22.  Will continue ASA into surgery.

## 2022-10-31 NOTE — DATA REVIEWED
[FreeTextEntry1] : 10/28/22 Cardiac Catheterization revealed LVEF 55%, LT heart catheterization demonstrated severe in stent restenosis of the LAD and the diagonal branches. LAD 90% stenosis and previously placed stent is a drug-eluting stent and is partially occluded. First diagonal 90% stenosis and previously placed stent is a drug-eluting stent and is partially occluded.  There is mild non obstructive disease of the LCx. The RCA has moderate in stent restenosis of the mid RCA which recently had brachytherapy.  There is 60% stenosis. \par \par 10/10/22 CCTA revealed The LM coronary artery has mild (<30%) calcified plaque. Focal region of low attenuation noted at the origin of the stent in the proximal LAD compatible with severe in-stent restenosis. The LAD has mild (30-49%) noncalcified plaque in the mid segment and mild (30-49%) noncalcified and calcified plaque in the distal segment. Myocardial bridging noted in the mid vessel. The distal LAD wraps around the left ventricular apex. Calcified plaque is present in a very small-caliber first diagonal branch. Severe in-stent restenosis noted in the stented (proximal vessel) second diagonal branch. Severe mixed noncalcified and calcified plaque is present in the second diagonal branch just distal to the stent. Distal to the obstructive plaque in the second diagonal branch no significant luminal stenosis noted.\par - The LCX coronary artery has mild (30-49%) noncalcified and calcified plaque in the proximal segment. The stent extending from the proximal segment to the obtuse marginal (OM) branch appears patent. The distal LCX has minimal (<30%) noncalcified and calcified plaque. The OM branch distal to the stent has minimal (<30%) noncalcified plaque.\par - The long stent extending from the proximal RCA to the distal segment appears patent. The distal vessel (distal to the stent) has mild (30-49%) noncalcified and calcified plaque. The small right posterior descending artery (PDA) has minimal (<30%) noncalcified plaque. A small right posterolateral (RPL) branch is present.\par - Aorta:Minimal noncalcified and calcified plaque is present in the visualized thoracic aorta.There is a suspected patent foramen ovale with a very small volume of left-to-right flow of contrast agent.\par - Non-cardiac: Few very small lymph nodes are present in the hilar regions bilaterally. Patchy ground glass opacities in the posterior aspects of both lower lobes represent dependent atelectasis. Visualized osseous structures demonstrate degenerative changes of the spine.\par \par 6/22/22 Cardiac catheterization revealed  Mid right coronary artery: There is a 70 % stenosis.  \par Interventional Findings: \par A successful Balloon angioplasty was performed . A successful Beta Brachytherapy was performed. \par \par \par

## 2022-11-01 ENCOUNTER — APPOINTMENT (OUTPATIENT)
Dept: CARDIOTHORACIC SURGERY | Facility: CLINIC | Age: 62
End: 2022-11-01

## 2022-11-01 DIAGNOSIS — Z87.891 PERSONAL HISTORY OF NICOTINE DEPENDENCE: ICD-10-CM

## 2022-11-01 DIAGNOSIS — Z78.9 OTHER SPECIFIED HEALTH STATUS: ICD-10-CM

## 2022-11-01 DIAGNOSIS — I10 ESSENTIAL (PRIMARY) HYPERTENSION: ICD-10-CM

## 2022-11-01 DIAGNOSIS — Z82.49 FAMILY HISTORY OF ISCHEMIC HEART DISEASE AND OTHER DISEASES OF THE CIRCULATORY SYSTEM: ICD-10-CM

## 2022-11-01 LAB
A1C WITH ESTIMATED AVERAGE GLUCOSE RESULT: 5.8 % — HIGH (ref 4–5.6)
ESTIMATED AVERAGE GLUCOSE: 120 MG/DL — HIGH (ref 68–114)
MRSA PCR RESULT.: SIGNIFICANT CHANGE UP
S AUREUS DNA NOSE QL NAA+PROBE: SIGNIFICANT CHANGE UP

## 2022-11-03 ENCOUNTER — INPATIENT (INPATIENT)
Facility: HOSPITAL | Age: 62
LOS: 4 days | Discharge: HOME | DRG: 235 | End: 2022-11-08
Attending: THORACIC SURGERY (CARDIOTHORACIC VASCULAR SURGERY) | Admitting: THORACIC SURGERY (CARDIOTHORACIC VASCULAR SURGERY)
Payer: COMMERCIAL

## 2022-11-03 ENCOUNTER — APPOINTMENT (OUTPATIENT)
Dept: CARDIOTHORACIC SURGERY | Facility: HOSPITAL | Age: 62
End: 2022-11-03

## 2022-11-03 VITALS
SYSTOLIC BLOOD PRESSURE: 137 MMHG | TEMPERATURE: 98 F | DIASTOLIC BLOOD PRESSURE: 83 MMHG | OXYGEN SATURATION: 97 % | WEIGHT: 193.79 LBS | RESPIRATION RATE: 16 BRPM | HEIGHT: 69 IN | HEART RATE: 63 BPM

## 2022-11-03 DIAGNOSIS — Z95.5 PRESENCE OF CORONARY ANGIOPLASTY IMPLANT AND GRAFT: Chronic | ICD-10-CM

## 2022-11-03 DIAGNOSIS — I25.10 ATHEROSCLEROTIC HEART DISEASE OF NATIVE CORONARY ARTERY WITHOUT ANGINA PECTORIS: ICD-10-CM

## 2022-11-03 LAB
ALBUMIN SERPL ELPH-MCNC: 4 G/DL — SIGNIFICANT CHANGE UP (ref 3.3–5)
ALP SERPL-CCNC: 33 U/L — LOW (ref 40–120)
ALT FLD-CCNC: 12 U/L — SIGNIFICANT CHANGE UP (ref 10–45)
ANION GAP SERPL CALC-SCNC: 16 MMOL/L — SIGNIFICANT CHANGE UP (ref 5–17)
APTT BLD: 32.5 SEC — SIGNIFICANT CHANGE UP (ref 27.5–35.5)
AST SERPL-CCNC: 27 U/L — SIGNIFICANT CHANGE UP (ref 10–40)
BASE EXCESS BLDV CALC-SCNC: -0.2 MMOL/L — SIGNIFICANT CHANGE UP (ref -2–3)
BASE EXCESS BLDV CALC-SCNC: -1.8 MMOL/L — SIGNIFICANT CHANGE UP (ref -2–3)
BASE EXCESS BLDV CALC-SCNC: -3.4 MMOL/L — LOW (ref -2–3)
BASOPHILS # BLD AUTO: 0.06 K/UL — SIGNIFICANT CHANGE UP (ref 0–0.2)
BASOPHILS NFR BLD AUTO: 0.4 % — SIGNIFICANT CHANGE UP (ref 0–2)
BILIRUB SERPL-MCNC: 0.9 MG/DL — SIGNIFICANT CHANGE UP (ref 0.2–1.2)
BLD GP AB SCN SERPL QL: NEGATIVE — SIGNIFICANT CHANGE UP
BLOOD GAS VENOUS - CREATININE: SIGNIFICANT CHANGE UP MG/DL (ref 0.5–1.3)
BUN SERPL-MCNC: 14 MG/DL — SIGNIFICANT CHANGE UP (ref 7–23)
CA-I SERPL-SCNC: 0.98 MMOL/L — LOW (ref 1.15–1.33)
CA-I SERPL-SCNC: 1.05 MMOL/L — LOW (ref 1.15–1.33)
CA-I SERPL-SCNC: 1.05 MMOL/L — LOW (ref 1.15–1.33)
CALCIUM SERPL-MCNC: 8.2 MG/DL — LOW (ref 8.4–10.5)
CHLORIDE BLDV-SCNC: 102 MMOL/L — SIGNIFICANT CHANGE UP (ref 96–108)
CHLORIDE BLDV-SCNC: 104 MMOL/L — SIGNIFICANT CHANGE UP (ref 96–108)
CHLORIDE BLDV-SCNC: 105 MMOL/L — SIGNIFICANT CHANGE UP (ref 96–108)
CHLORIDE SERPL-SCNC: 104 MMOL/L — SIGNIFICANT CHANGE UP (ref 96–108)
CK MB BLD-MCNC: 10.6 % — HIGH (ref 0–3.5)
CK MB CFR SERPL CALC: 32.2 NG/ML — HIGH (ref 0–6.7)
CK SERPL-CCNC: 303 U/L — HIGH (ref 30–200)
CO2 BLDV-SCNC: 24 MMOL/L — SIGNIFICANT CHANGE UP (ref 22–26)
CO2 BLDV-SCNC: 26 MMOL/L — SIGNIFICANT CHANGE UP (ref 22–26)
CO2 BLDV-SCNC: 26 MMOL/L — SIGNIFICANT CHANGE UP (ref 22–26)
CO2 SERPL-SCNC: 22 MMOL/L — SIGNIFICANT CHANGE UP (ref 22–31)
CREAT SERPL-MCNC: 0.84 MG/DL — SIGNIFICANT CHANGE UP (ref 0.5–1.3)
EGFR: 99 ML/MIN/1.73M2 — SIGNIFICANT CHANGE UP
EOSINOPHIL # BLD AUTO: 0.05 K/UL — SIGNIFICANT CHANGE UP (ref 0–0.5)
EOSINOPHIL NFR BLD AUTO: 0.3 % — SIGNIFICANT CHANGE UP (ref 0–6)
FIBRINOGEN PPP-MCNC: 259 MG/DL — LOW (ref 330–520)
GAS PNL BLDA: SIGNIFICANT CHANGE UP
GAS PNL BLDV: 133 MMOL/L — LOW (ref 136–145)
GAS PNL BLDV: 135 MMOL/L — LOW (ref 136–145)
GAS PNL BLDV: 135 MMOL/L — LOW (ref 136–145)
GAS PNL BLDV: SIGNIFICANT CHANGE UP
GLUCOSE BLDC GLUCOMTR-MCNC: 179 MG/DL — HIGH (ref 70–99)
GLUCOSE BLDC GLUCOMTR-MCNC: 96 MG/DL — SIGNIFICANT CHANGE UP (ref 70–99)
GLUCOSE BLDV-MCNC: 110 MG/DL — HIGH (ref 70–99)
GLUCOSE BLDV-MCNC: 120 MG/DL — HIGH (ref 70–99)
GLUCOSE BLDV-MCNC: 125 MG/DL — HIGH (ref 70–99)
GLUCOSE SERPL-MCNC: 174 MG/DL — HIGH (ref 70–99)
HCO3 BLDV-SCNC: 23 MMOL/L — SIGNIFICANT CHANGE UP (ref 22–29)
HCO3 BLDV-SCNC: 24 MMOL/L — SIGNIFICANT CHANGE UP (ref 22–29)
HCO3 BLDV-SCNC: 25 MMOL/L — SIGNIFICANT CHANGE UP (ref 22–29)
HCT VFR BLD CALC: 35.4 % — LOW (ref 39–50)
HCT VFR BLDA CALC: 29 % — LOW (ref 39–51)
HCT VFR BLDA CALC: 30 % — LOW (ref 39–51)
HCT VFR BLDA CALC: 30 % — LOW (ref 39–51)
HEPARINASE TEG R TIME: 7.3 MIN — SIGNIFICANT CHANGE UP (ref 4.3–8.3)
HEPARINASE TEG R TIME: 8.8 MIN (ref 4.3–8.3)
HGB BLD CALC-MCNC: 10 G/DL — LOW (ref 12.6–17.4)
HGB BLD CALC-MCNC: 10.1 G/DL — LOW (ref 12.6–17.4)
HGB BLD CALC-MCNC: 9.8 G/DL — LOW (ref 12.6–17.4)
HGB BLD-MCNC: 12.3 G/DL — LOW (ref 13–17)
IMM GRANULOCYTES NFR BLD AUTO: 3.3 % — HIGH (ref 0–0.9)
INR BLD: 1.28 RATIO — HIGH (ref 0.88–1.16)
LACTATE BLDV-MCNC: 1.1 MMOL/L — SIGNIFICANT CHANGE UP (ref 0.5–2)
LACTATE BLDV-MCNC: 1.6 MMOL/L — SIGNIFICANT CHANGE UP (ref 0.5–2)
LACTATE BLDV-MCNC: 1.7 MMOL/L — SIGNIFICANT CHANGE UP (ref 0.5–2)
LYMPHOCYTES # BLD AUTO: 1.09 K/UL — SIGNIFICANT CHANGE UP (ref 1–3.3)
LYMPHOCYTES # BLD AUTO: 6.8 % — LOW (ref 13–44)
MAGNESIUM SERPL-MCNC: 2.6 MG/DL — SIGNIFICANT CHANGE UP (ref 1.6–2.6)
MANUAL SMEAR VERIFICATION: SIGNIFICANT CHANGE UP
MCHC RBC-ENTMCNC: 30.4 PG — SIGNIFICANT CHANGE UP (ref 27–34)
MCHC RBC-ENTMCNC: 34.7 GM/DL — SIGNIFICANT CHANGE UP (ref 32–36)
MCV RBC AUTO: 87.6 FL — SIGNIFICANT CHANGE UP (ref 80–100)
MONOCYTES # BLD AUTO: 0.58 K/UL — SIGNIFICANT CHANGE UP (ref 0–0.9)
MONOCYTES NFR BLD AUTO: 3.6 % — SIGNIFICANT CHANGE UP (ref 2–14)
NEUTROPHILS # BLD AUTO: 13.64 K/UL — HIGH (ref 1.8–7.4)
NEUTROPHILS NFR BLD AUTO: 85.6 % — HIGH (ref 43–77)
NRBC # BLD: 0 /100 WBCS — SIGNIFICANT CHANGE UP (ref 0–0)
PCO2 BLDV: 41 MMHG — LOW (ref 42–55)
PCO2 BLDV: 46 MMHG — SIGNIFICANT CHANGE UP (ref 42–55)
PCO2 BLDV: 47 MMHG — SIGNIFICANT CHANGE UP (ref 42–55)
PH BLDV: 7.3 — LOW (ref 7.32–7.43)
PH BLDV: 7.33 — SIGNIFICANT CHANGE UP (ref 7.32–7.43)
PH BLDV: 7.39 — SIGNIFICANT CHANGE UP (ref 7.32–7.43)
PHOSPHATE SERPL-MCNC: 3.3 MG/DL — SIGNIFICANT CHANGE UP (ref 2.5–4.5)
PLAT MORPH BLD: NORMAL — SIGNIFICANT CHANGE UP
PLATELET # BLD AUTO: 236 K/UL — SIGNIFICANT CHANGE UP (ref 150–400)
PLATELET MAPPING ACTF MAX AMPLITUDE: 8.4 MM — SIGNIFICANT CHANGE UP (ref 2–19)
PLATELET MAPPING ADP MAXIMUM AMPLITUDE: 49 MM — SIGNIFICANT CHANGE UP (ref 45–69)
PLATELET MAPPING ADP PERCENT INHIBITION: 25 % (ref 0–17)
PLATELET MAPPING HKH MAXIMUM AMPLITUDE: 62.5 MM — SIGNIFICANT CHANGE UP (ref 53–68)
PO2 BLDV: 53 MMHG — HIGH (ref 25–45)
PO2 BLDV: 78 MMHG — HIGH (ref 25–45)
PO2 BLDV: 96 MMHG — HIGH (ref 25–45)
POTASSIUM BLDV-SCNC: 4.5 MMOL/L — SIGNIFICANT CHANGE UP (ref 3.5–5.1)
POTASSIUM BLDV-SCNC: 5 MMOL/L — SIGNIFICANT CHANGE UP (ref 3.5–5.1)
POTASSIUM BLDV-SCNC: 5.9 MMOL/L — HIGH (ref 3.5–5.1)
POTASSIUM SERPL-MCNC: 4.6 MMOL/L — SIGNIFICANT CHANGE UP (ref 3.5–5.3)
POTASSIUM SERPL-SCNC: 4.6 MMOL/L — SIGNIFICANT CHANGE UP (ref 3.5–5.3)
PROT SERPL-MCNC: 5.7 G/DL — LOW (ref 6–8.3)
PROTHROM AB SERPL-ACNC: 14.9 SEC — HIGH (ref 10.5–13.4)
RAPIDTEG MAXIMUM AMPLITUDE: 54 MM — SIGNIFICANT CHANGE UP (ref 52–70)
RAPIDTEG MAXIMUM AMPLITUDE: 60.8 MM — SIGNIFICANT CHANGE UP (ref 52–70)
RBC # BLD: 4.04 M/UL — LOW (ref 4.2–5.8)
RBC # FLD: 11.9 % — SIGNIFICANT CHANGE UP (ref 10.3–14.5)
RBC BLD AUTO: SIGNIFICANT CHANGE UP
RH IG SCN BLD-IMP: NEGATIVE — SIGNIFICANT CHANGE UP
SAO2 % BLDV: 88.7 % — HIGH (ref 67–88)
SAO2 % BLDV: 96.5 % — HIGH (ref 67–88)
SAO2 % BLDV: 98.6 % — HIGH (ref 67–88)
SODIUM SERPL-SCNC: 142 MMOL/L — SIGNIFICANT CHANGE UP (ref 135–145)
TEG FUNCTIONAL FIBRINOGEN: 17.9 MM — SIGNIFICANT CHANGE UP (ref 15–32)
TEG FUNCTIONAL FIBRINOGEN: 20.6 MM — SIGNIFICANT CHANGE UP (ref 15–32)
TEG MAXIMUM AMPLITUDE: 56.6 MM — SIGNIFICANT CHANGE UP (ref 52–69)
TEG MAXIMUM AMPLITUDE: 57.8 MM — SIGNIFICANT CHANGE UP (ref 52–69)
TEG REACTION TIME: 8.2 MIN — SIGNIFICANT CHANGE UP (ref 4.6–9.1)
TEG REACTION TIME: 9.2 MIN (ref 4.6–9.1)
TROPONIN T, HIGH SENSITIVITY RESULT: 489 NG/L — HIGH (ref 0–51)
WBC # BLD: 15.94 K/UL — HIGH (ref 3.8–10.5)
WBC # FLD AUTO: 15.94 K/UL — HIGH (ref 3.8–10.5)

## 2022-11-03 PROCEDURE — 99291 CRITICAL CARE FIRST HOUR: CPT

## 2022-11-03 PROCEDURE — 33518 CABG ARTERY-VEIN TWO: CPT

## 2022-11-03 PROCEDURE — 33533 CABG ARTERIAL SINGLE: CPT

## 2022-11-03 PROCEDURE — 33508 ENDOSCOPIC VEIN HARVEST: CPT | Mod: 59

## 2022-11-03 PROCEDURE — 71045 X-RAY EXAM CHEST 1 VIEW: CPT | Mod: 26

## 2022-11-03 DEVICE — CANNULA ARTERIAL STRAIGHT 20FR X 3/8" VENTED: Type: IMPLANTABLE DEVICE | Status: FUNCTIONAL

## 2022-11-03 DEVICE — CANNULA VENOUS 2 STAGE THIN FLEX 36/46FR X 1/2" WITH RETURN DIAL: Type: IMPLANTABLE DEVICE | Status: FUNCTIONAL

## 2022-11-03 DEVICE — OCCLUDER INTERNAL VESSEL FLO-RESTER 1 X 12MM: Type: IMPLANTABLE DEVICE | Status: FUNCTIONAL

## 2022-11-03 DEVICE — KIT CVC 2LUM MAC 9FR CHG: Type: IMPLANTABLE DEVICE | Status: FUNCTIONAL

## 2022-11-03 DEVICE — KIT A-LINE 1LUM 20G X 12CM SAFE KIT: Type: IMPLANTABLE DEVICE | Status: FUNCTIONAL

## 2022-11-03 DEVICE — CANNULA AORTIC ROOT 14G X 14CM FLANGED: Type: IMPLANTABLE DEVICE | Status: FUNCTIONAL

## 2022-11-03 DEVICE — PACING WIRE WHITE M-24 BAREWIRE 26MM X 89MM: Type: IMPLANTABLE DEVICE | Status: FUNCTIONAL

## 2022-11-03 DEVICE — LIGATING CLIPS WECK HORIZON SMALL-WIDE (RED) 24: Type: IMPLANTABLE DEVICE | Status: FUNCTIONAL

## 2022-11-03 DEVICE — CHEST DRAIN THORACIC ARGYLE PVC 32FR RIGHT ANGLE: Type: IMPLANTABLE DEVICE | Status: FUNCTIONAL

## 2022-11-03 DEVICE — CHEST DRAIN THORACIC ARGYLE PVC 36FR STRAIGHT: Type: IMPLANTABLE DEVICE | Status: FUNCTIONAL

## 2022-11-03 DEVICE — LIGATING CLIPS WECK HORIZON MEDIUM (BLUE) 24: Type: IMPLANTABLE DEVICE | Status: FUNCTIONAL

## 2022-11-03 DEVICE — CANNULA IMA 1MM BLUNT TIP: Type: IMPLANTABLE DEVICE | Status: FUNCTIONAL

## 2022-11-03 DEVICE — IMPLANTABLE DEVICE: Type: IMPLANTABLE DEVICE | Status: FUNCTIONAL

## 2022-11-03 RX ORDER — SODIUM CHLORIDE 9 MG/ML
500 INJECTION, SOLUTION INTRAVENOUS ONCE
Refills: 0 | Status: COMPLETED | OUTPATIENT
Start: 2022-11-03 | End: 2022-11-03

## 2022-11-03 RX ORDER — NEBIVOLOL HYDROCHLORIDE 5 MG/1
1 TABLET ORAL
Qty: 0 | Refills: 0 | DISCHARGE

## 2022-11-03 RX ORDER — ROSUVASTATIN CALCIUM 5 MG/1
1 TABLET ORAL
Qty: 0 | Refills: 0 | DISCHARGE

## 2022-11-03 RX ORDER — CLOPIDOGREL BISULFATE 75 MG/1
1 TABLET, FILM COATED ORAL
Qty: 0 | Refills: 0 | DISCHARGE

## 2022-11-03 RX ORDER — DEXTROSE 50 % IN WATER 50 %
25 SYRINGE (ML) INTRAVENOUS
Refills: 0 | Status: DISCONTINUED | OUTPATIENT
Start: 2022-11-03 | End: 2022-11-04

## 2022-11-03 RX ORDER — ACETAMINOPHEN 500 MG
650 TABLET ORAL EVERY 6 HOURS
Refills: 0 | Status: COMPLETED | OUTPATIENT
Start: 2022-11-03 | End: 2022-11-06

## 2022-11-03 RX ORDER — CHLORHEXIDINE GLUCONATE 213 G/1000ML
1 SOLUTION TOPICAL DAILY
Refills: 0 | Status: DISCONTINUED | OUTPATIENT
Start: 2022-11-03 | End: 2022-11-03

## 2022-11-03 RX ORDER — CHLORHEXIDINE GLUCONATE 213 G/1000ML
1 SOLUTION TOPICAL ONCE
Refills: 0 | Status: DISCONTINUED | OUTPATIENT
Start: 2022-11-03 | End: 2022-11-03

## 2022-11-03 RX ORDER — ASCORBIC ACID 60 MG
500 TABLET,CHEWABLE ORAL
Refills: 0 | Status: DISCONTINUED | OUTPATIENT
Start: 2022-11-03 | End: 2022-11-08

## 2022-11-03 RX ORDER — ICOSAPENT ETHYL 500 MG/1
2 CAPSULE, LIQUID FILLED ORAL
Qty: 0 | Refills: 0 | DISCHARGE

## 2022-11-03 RX ORDER — DEXTROSE 50 % IN WATER 50 %
50 SYRINGE (ML) INTRAVENOUS
Refills: 0 | Status: DISCONTINUED | OUTPATIENT
Start: 2022-11-03 | End: 2022-11-04

## 2022-11-03 RX ORDER — ACETAMINOPHEN 500 MG
1000 TABLET ORAL ONCE
Refills: 0 | Status: COMPLETED | OUTPATIENT
Start: 2022-11-03 | End: 2022-11-03

## 2022-11-03 RX ORDER — GABAPENTIN 400 MG/1
300 CAPSULE ORAL ONCE
Refills: 0 | Status: COMPLETED | OUTPATIENT
Start: 2022-11-03 | End: 2022-11-03

## 2022-11-03 RX ORDER — INSULIN HUMAN 100 [IU]/ML
3 INJECTION, SOLUTION SUBCUTANEOUS
Qty: 100 | Refills: 0 | Status: DISCONTINUED | OUTPATIENT
Start: 2022-11-03 | End: 2022-11-04

## 2022-11-03 RX ORDER — PANTOPRAZOLE SODIUM 20 MG/1
1 TABLET, DELAYED RELEASE ORAL
Qty: 0 | Refills: 0 | DISCHARGE

## 2022-11-03 RX ORDER — CEFUROXIME AXETIL 250 MG
1500 TABLET ORAL EVERY 8 HOURS
Refills: 0 | Status: COMPLETED | OUTPATIENT
Start: 2022-11-04 | End: 2022-11-05

## 2022-11-03 RX ORDER — ASPIRIN/CALCIUM CARB/MAGNESIUM 324 MG
1 TABLET ORAL
Qty: 0 | Refills: 0 | DISCHARGE

## 2022-11-03 RX ORDER — POTASSIUM CHLORIDE 20 MEQ
10 PACKET (EA) ORAL
Refills: 0 | Status: DISCONTINUED | OUTPATIENT
Start: 2022-11-03 | End: 2022-11-04

## 2022-11-03 RX ORDER — CALCIUM GLUCONATE 100 MG/ML
1 VIAL (ML) INTRAVENOUS ONCE
Refills: 0 | Status: COMPLETED | OUTPATIENT
Start: 2022-11-03 | End: 2022-11-03

## 2022-11-03 RX ORDER — LIDOCAINE HCL 20 MG/ML
0.2 VIAL (ML) INJECTION ONCE
Refills: 0 | Status: DISCONTINUED | OUTPATIENT
Start: 2022-11-03 | End: 2022-11-03

## 2022-11-03 RX ORDER — OXYCODONE HYDROCHLORIDE 5 MG/1
5 TABLET ORAL EVERY 4 HOURS
Refills: 0 | Status: DISCONTINUED | OUTPATIENT
Start: 2022-11-03 | End: 2022-11-08

## 2022-11-03 RX ORDER — HYDROMORPHONE HYDROCHLORIDE 2 MG/ML
0.5 INJECTION INTRAMUSCULAR; INTRAVENOUS; SUBCUTANEOUS EVERY 6 HOURS
Refills: 0 | Status: DISCONTINUED | OUTPATIENT
Start: 2022-11-03 | End: 2022-11-04

## 2022-11-03 RX ORDER — MEPERIDINE HYDROCHLORIDE 50 MG/ML
25 INJECTION INTRAMUSCULAR; INTRAVENOUS; SUBCUTANEOUS ONCE
Refills: 0 | Status: DISCONTINUED | OUTPATIENT
Start: 2022-11-03 | End: 2022-11-04

## 2022-11-03 RX ORDER — SENNA PLUS 8.6 MG/1
2 TABLET ORAL AT BEDTIME
Refills: 0 | Status: DISCONTINUED | OUTPATIENT
Start: 2022-11-04 | End: 2022-11-08

## 2022-11-03 RX ORDER — ACETAMINOPHEN 500 MG
650 TABLET ORAL EVERY 6 HOURS
Refills: 0 | Status: DISCONTINUED | OUTPATIENT
Start: 2022-11-06 | End: 2022-11-08

## 2022-11-03 RX ORDER — GABAPENTIN 400 MG/1
100 CAPSULE ORAL EVERY 8 HOURS
Refills: 0 | Status: DISCONTINUED | OUTPATIENT
Start: 2022-11-03 | End: 2022-11-08

## 2022-11-03 RX ORDER — SODIUM CHLORIDE 9 MG/ML
1000 INJECTION INTRAMUSCULAR; INTRAVENOUS; SUBCUTANEOUS
Refills: 0 | Status: DISCONTINUED | OUTPATIENT
Start: 2022-11-03 | End: 2022-11-04

## 2022-11-03 RX ORDER — METOCLOPRAMIDE HCL 10 MG
10 TABLET ORAL EVERY 8 HOURS
Refills: 0 | Status: COMPLETED | OUTPATIENT
Start: 2022-11-03 | End: 2022-11-05

## 2022-11-03 RX ORDER — ALBUMIN HUMAN 25 %
250 VIAL (ML) INTRAVENOUS ONCE
Refills: 0 | Status: COMPLETED | OUTPATIENT
Start: 2022-11-03 | End: 2022-11-03

## 2022-11-03 RX ORDER — CHLORHEXIDINE GLUCONATE 213 G/1000ML
5 SOLUTION TOPICAL
Refills: 0 | Status: DISCONTINUED | OUTPATIENT
Start: 2022-11-03 | End: 2022-11-04

## 2022-11-03 RX ORDER — OXYCODONE HYDROCHLORIDE 5 MG/1
10 TABLET ORAL EVERY 4 HOURS
Refills: 0 | Status: DISCONTINUED | OUTPATIENT
Start: 2022-11-03 | End: 2022-11-08

## 2022-11-03 RX ORDER — ASPIRIN/CALCIUM CARB/MAGNESIUM 324 MG
300 TABLET ORAL ONCE
Refills: 0 | Status: DISCONTINUED | OUTPATIENT
Start: 2022-11-03 | End: 2022-11-04

## 2022-11-03 RX ORDER — CHLORHEXIDINE GLUCONATE 213 G/1000ML
15 SOLUTION TOPICAL EVERY 12 HOURS
Refills: 0 | Status: DISCONTINUED | OUTPATIENT
Start: 2022-11-03 | End: 2022-11-03

## 2022-11-03 RX ORDER — CEFAZOLIN SODIUM 1 G
2000 VIAL (EA) INJECTION ONCE
Refills: 0 | Status: COMPLETED | OUTPATIENT
Start: 2022-11-03 | End: 2022-11-03

## 2022-11-03 RX ORDER — ONDANSETRON 8 MG/1
4 TABLET, FILM COATED ORAL ONCE
Refills: 0 | Status: COMPLETED | OUTPATIENT
Start: 2022-11-03 | End: 2022-11-03

## 2022-11-03 RX ORDER — DEXMEDETOMIDINE HYDROCHLORIDE IN 0.9% SODIUM CHLORIDE 4 UG/ML
0.2 INJECTION INTRAVENOUS
Qty: 200 | Refills: 0 | Status: DISCONTINUED | OUTPATIENT
Start: 2022-11-03 | End: 2022-11-04

## 2022-11-03 RX ORDER — FAMOTIDINE 10 MG/ML
20 INJECTION INTRAVENOUS EVERY 12 HOURS
Refills: 0 | Status: DISCONTINUED | OUTPATIENT
Start: 2022-11-03 | End: 2022-11-04

## 2022-11-03 RX ORDER — BNT162B2 ORIGINAL AND OMICRON BA.4/BA.5 .1125; .1125 MG/2.25ML; MG/2.25ML
0.3 INJECTION, SUSPENSION INTRAMUSCULAR ONCE
Refills: 0 | Status: DISCONTINUED | OUTPATIENT
Start: 2022-11-03 | End: 2022-11-03

## 2022-11-03 RX ORDER — AMIODARONE HYDROCHLORIDE 400 MG/1
400 TABLET ORAL
Refills: 0 | Status: COMPLETED | OUTPATIENT
Start: 2022-11-03 | End: 2022-11-06

## 2022-11-03 RX ORDER — POTASSIUM CHLORIDE 20 MEQ
10 PACKET (EA) ORAL
Refills: 0 | Status: COMPLETED | OUTPATIENT
Start: 2022-11-03 | End: 2022-11-03

## 2022-11-03 RX ORDER — CHLORHEXIDINE GLUCONATE 213 G/1000ML
1 SOLUTION TOPICAL DAILY
Refills: 0 | Status: ACTIVE | OUTPATIENT
Start: 2022-11-03 | End: 2022-11-08

## 2022-11-03 RX ORDER — ALBUMIN HUMAN 25 %
250 VIAL (ML) INTRAVENOUS
Refills: 0 | Status: COMPLETED | OUTPATIENT
Start: 2022-11-03 | End: 2022-11-03

## 2022-11-03 RX ORDER — BEMPEDOIC ACID 180 MG/1
1 TABLET, FILM COATED ORAL
Qty: 0 | Refills: 0 | DISCHARGE

## 2022-11-03 RX ORDER — NOREPINEPHRINE BITARTRATE/D5W 8 MG/250ML
0.05 PLASTIC BAG, INJECTION (ML) INTRAVENOUS
Qty: 8 | Refills: 0 | Status: DISCONTINUED | OUTPATIENT
Start: 2022-11-03 | End: 2022-11-04

## 2022-11-03 RX ORDER — ASPIRIN/CALCIUM CARB/MAGNESIUM 324 MG
81 TABLET ORAL DAILY
Refills: 0 | Status: DISCONTINUED | OUTPATIENT
Start: 2022-11-03 | End: 2022-11-08

## 2022-11-03 RX ORDER — POLYETHYLENE GLYCOL 3350 17 G/17G
17 POWDER, FOR SOLUTION ORAL DAILY
Refills: 0 | Status: DISCONTINUED | OUTPATIENT
Start: 2022-11-04 | End: 2022-11-08

## 2022-11-03 RX ORDER — SODIUM CHLORIDE 9 MG/ML
3 INJECTION INTRAMUSCULAR; INTRAVENOUS; SUBCUTANEOUS EVERY 8 HOURS
Refills: 0 | Status: DISCONTINUED | OUTPATIENT
Start: 2022-11-03 | End: 2022-11-03

## 2022-11-03 RX ADMIN — Medication 10 MILLIGRAM(S): at 23:30

## 2022-11-03 RX ADMIN — HYDROMORPHONE HYDROCHLORIDE 0.5 MILLIGRAM(S): 2 INJECTION INTRAMUSCULAR; INTRAVENOUS; SUBCUTANEOUS at 22:30

## 2022-11-03 RX ADMIN — Medication 100 MILLIEQUIVALENT(S): at 22:00

## 2022-11-03 RX ADMIN — Medication 100 GRAM(S): at 20:31

## 2022-11-03 RX ADMIN — GABAPENTIN 300 MILLIGRAM(S): 400 CAPSULE ORAL at 11:22

## 2022-11-03 RX ADMIN — Medication 1000 MILLIGRAM(S): at 11:22

## 2022-11-03 RX ADMIN — HYDROMORPHONE HYDROCHLORIDE 0.5 MILLIGRAM(S): 2 INJECTION INTRAMUSCULAR; INTRAVENOUS; SUBCUTANEOUS at 20:52

## 2022-11-03 RX ADMIN — Medication 100 MILLIEQUIVALENT(S): at 21:30

## 2022-11-03 RX ADMIN — Medication 125 MILLILITER(S): at 00:09

## 2022-11-03 RX ADMIN — SODIUM CHLORIDE 2000 MILLILITER(S): 9 INJECTION, SOLUTION INTRAVENOUS at 20:32

## 2022-11-03 RX ADMIN — ONDANSETRON 4 MILLIGRAM(S): 8 TABLET, FILM COATED ORAL at 11:15

## 2022-11-03 RX ADMIN — SODIUM CHLORIDE 2000 MILLILITER(S): 9 INJECTION, SOLUTION INTRAVENOUS at 21:30

## 2022-11-03 RX ADMIN — HYDROMORPHONE HYDROCHLORIDE 0.5 MILLIGRAM(S): 2 INJECTION INTRAMUSCULAR; INTRAVENOUS; SUBCUTANEOUS at 22:00

## 2022-11-03 RX ADMIN — Medication 125 MILLILITER(S): at 23:00

## 2022-11-03 RX ADMIN — HYDROMORPHONE HYDROCHLORIDE 0.5 MILLIGRAM(S): 2 INJECTION INTRAMUSCULAR; INTRAVENOUS; SUBCUTANEOUS at 20:37

## 2022-11-03 NOTE — BRIEF OPERATIVE NOTE - OPERATION/FINDINGS
Aortic XClamp: 45    |    Total CPB: 71  Procedure:  LIMA-LAD | SVG-Ramus | SVG-PDA    Left greater saphenous vein harvested endoscopically by ZAHEER PALM

## 2022-11-03 NOTE — PATIENT PROFILE ADULT - FALL HARM RISK - UNIVERSAL INTERVENTIONS
Bed in lowest position, wheels locked, appropriate side rails in place/Call bell, personal items and telephone in reach/Instruct patient to call for assistance before getting out of bed or chair/Non-slip footwear when patient is out of bed/[place] to call system/Physically safe environment - no spills, clutter or unnecessary equipment/Purposeful Proactive Rounding/Room/bathroom lighting operational, light cord in reach

## 2022-11-03 NOTE — PROGRESS NOTE ADULT - SUBJECTIVE AND OBJECTIVE BOX
Patient seen and examined at the bedside.    Remained critically ill on continuous ICU monitoring.    OBJECTIVE:  Vital Signs Last 24 Hrs  T(C): 36.5 (03 Nov 2022 13:45), Max: 36.5 (03 Nov 2022 11:27)  T(F): 97.7 (03 Nov 2022 11:27), Max: 97.7 (03 Nov 2022 11:27)  HR: 60 (03 Nov 2022 19:09) (60 - 63)  BP: 137/83 (03 Nov 2022 13:45) (137/83 - 137/83)  BP(mean): --  RR: 16 (03 Nov 2022 13:45) (16 - 16)  SpO2: 100% (03 Nov 2022 19:09) (97% - 100%)          Physical Exam:   General: Intubated, multiple lines gtt  Neurology: sedated  Eyes: bilateral pupils equal and reactive   ENT/Neck: Neck supple, trachea midline, No JVD   Respiratory: Clear bilaterally   CV: S1S2, no murmurs        [x] Sternal dressing, [x] Mediastinal CT, [x] Pleural CT,         [x] Sinus rhythm  Abdominal: Soft, NT, ND +BS   Extremities: 1-2+ pedal edema noted, + peripheral pulses   Skin: No Rashes, Hematoma, Ecchymosis                           ============================I/O===========================   I&O's Detail    ============================ LABS =========================              ABG - ( 03 Nov 2022 19:25 )  pH, Arterial: 7.30  pH, Blood: x     /  pCO2: 46    /  pO2: 281   / HCO3: 23    / Base Excess: -3.9  /  SaO2: 99.6                ======================Micro/Rad/Cardio=================  Culture: Reviewed   CXR: Reviewed  Echo: Reviewed  ======================================================  PAST MEDICAL & SURGICAL HISTORY:  HTN (hypertension)    HLD (hyperlipidemia)    CAD (coronary artery disease)    ROCHELLE on CPAP    S/P drug eluting coronary stent placement    ======================================================  Assessment:  62 year old male with past medical history of former smoker, HTN, HLD, Low back pain, CAD s/p multiple TRICE (most recent 5/23/22, Had brachytherapy to mid RCA on 6/22/22 ), ROCHELLE on CPAP with complaints of chest pain radiating to bilateral shoulder, occasional shortness of breath and occasional dizziness . Pt. endorses episodes of nonradiating chest pain with SOB/MARY prior to last PCI that have resolved with stent placement but did not feel better after the brachytherapy . He reports that his chest pain is with activities and needs to take rest to resolve the pain. His last chest pain was last week pretty much every day with less intensity. His recent 10/28/22 Cardiac Catheterization revealed LVEF 55%, LT heart catheterization demonstrated severe in stent restenosis of the LAD and the diagonal branches. LAD 90% stenosis and previously placed stent is a drug-eluting stent and is partially occluded. First diagonal 90% stenosis and previously placed stent is a drug-eluting stent and is partially occluded. There is mild non obstructive disease of the LCx. The RCA has moderate in stent restenosis of the mid RCA which recently had brachytherapy. There is 60% stenosis. He is referred by Dr.Ralph Keys with initial evaluation and management for CAD.     CAD s/p CABG x3   Hemodynamic instability  Hypovolemia  Leukocytosis   Post op respiratory insufficiency  Acute blood loss anemia  Stress hyperglycemia    ======================================================  Plan:   ***Neuro***  [x] Sedated with [x] Precedex   Post operative neuro assessment     ***Cardiovascular***  Mild AI   Invasive hemodynamic monitoring, assess perfusion indices   SR / Hct 35.4/ Lactate 1.8  Levophed is currently off   Continuos reassessment of hemodynamics   Continue with Amiodarone for atrial fibrillation prophylaxis.   Monitor chest tube outputs   [x]  ASA   Serial EKG and cardiac enzymes     ***Pulmonary***  Post op vent management   Titration of FiO2 and PEEP, follow SpO2, CXR, blood gasses     Mode: AC/ CMV (Assist Control/ Continuous Mandatory Ventilation)  RR (machine): 12  TV (machine): 500  FiO2: 100  PEEP: 5  ITime: 1  MAP: 11  PIP: 19              ***GI***  [x] NPO  [x]  Pepcid    ***Renal***  Continue to monitor I/Os, BUN/Creatinine.   Replete lytes PRN      ***ID***  No active antibiotic coverage      ***Endocrine***  [x] Stress Hyperglycemia: HbA1c 5.8%                - [x] Insulin gtt               - Need tight glycemic control to prevent wound infection.          Patient requires continuous monitoring with bedside rhythm monitoring, arterial line, pulse oximetry, ventilator monitoring; intermittent blood gas analysis. Care plan discussed with ICU care team. Patient remains critical; required more than usual ICU care.     Care Plan discussed with the ICU care team. Patient remained critical, at risk for life threatening decompensation.     By signing my name below, IRenny, attest that this documentation has been prepared under the direction and in the presence of Jeanine Peralta MD.  Electronically signed: Bib Barrett, 11-03-22 @ 19:35    IFabian, personally performed the services described in this documentation. all medical record entries made by the edmaribnakita were at my direction and in my presence. I have reviewed the chart and agree that the record reflects my personal performance and is accurate and complete  Electronically signed: Jeanine Peralta MD.    Care Time: 30  Patient seen and examined at the bedside.    Remained critically ill on continuous ICU monitoring.    OBJECTIVE:  Vital Signs Last 24 Hrs  T(C): 36.5 (03 Nov 2022 13:45), Max: 36.5 (03 Nov 2022 11:27)  T(F): 97.7 (03 Nov 2022 11:27), Max: 97.7 (03 Nov 2022 11:27)  HR: 60 (03 Nov 2022 19:09) (60 - 63)  BP: 137/83 (03 Nov 2022 13:45) (137/83 - 137/83)  BP(mean): --  RR: 16 (03 Nov 2022 13:45) (16 - 16)  SpO2: 100% (03 Nov 2022 19:09) (97% - 100%)          Physical Exam:   General: Intubated, multiple lines gtt  Neurology: sedated  Eyes: bilateral pupils equal and reactive   ENT/Neck: Neck supple, trachea midline, No JVD   Respiratory: Clear bilaterally   CV: S1S2, no murmurs        [x] Sternal dressing, [x] Mediastinal CT, [x] Pleural CT,         [x] Sinus rhythm  Abdominal: Soft, NT, ND +BS   Extremities: 1-2+ pedal edema noted, + peripheral pulses   Skin: No Rashes, Hematoma, Ecchymosis                           ============================I/O===========================   I&O's Detail    ============================ LABS =========================              ABG - ( 03 Nov 2022 19:25 )  pH, Arterial: 7.30  pH, Blood: x     /  pCO2: 46    /  pO2: 281   / HCO3: 23    / Base Excess: -3.9  /  SaO2: 99.6                ======================Micro/Rad/Cardio=================  Culture: Reviewed   CXR: Reviewed  Echo: Reviewed  ======================================================  PAST MEDICAL & SURGICAL HISTORY:  HTN (hypertension)    HLD (hyperlipidemia)    CAD (coronary artery disease)    ROCHELLE on CPAP    S/P drug eluting coronary stent placement    ======================================================  Assessment:  62 year old male with past medical history of former smoker, HTN, HLD, Low back pain, CAD s/p multiple TRICE (most recent 5/23/22, Had brachytherapy to mid RCA on 6/22/22 ), ROCHELLE on CPAP with complaints of chest pain radiating to bilateral shoulder, occasional shortness of breath and occasional dizziness . Pt. endorses episodes of nonradiating chest pain with SOB/MARY prior to last PCI that have resolved with stent placement but did not feel better after the brachytherapy . He reports that his chest pain is with activities and needs to take rest to resolve the pain. His last chest pain was last week pretty much every day with less intensity. His recent 10/28/22 Cardiac Catheterization revealed LVEF 55%, LT heart catheterization demonstrated severe in stent restenosis of the LAD and the diagonal branches. LAD 90% stenosis and previously placed stent is a drug-eluting stent and is partially occluded. First diagonal 90% stenosis and previously placed stent is a drug-eluting stent and is partially occluded. There is mild non obstructive disease of the LCx. The RCA has moderate in stent restenosis of the mid RCA which recently had brachytherapy. There is 60% stenosis. He is referred by Dr.Ralph Keys with initial evaluation and management for CAD.     CAD s/p CABG x3   Hemodynamic instability  Hypovolemia  Post op respiratory insufficiency  Acute blood loss anemia  Stress hyperglycemia    ======================================================  Plan:   ***Neuro***  [x] Sedated with [x] Precedex   Post operative neuro assessment     ***Cardiovascular***  Mild AI   Invasive hemodynamic monitoring, assess perfusion indices   SR / Hct 35.4/ Lactate 1.8  Levophed is currently off   Continuos reassessment of hemodynamics   Continue with Amiodarone for atrial fibrillation prophylaxis.   Monitor chest tube outputs   [x]  ASA   Serial EKG and cardiac enzymes     ***Pulmonary***  Post op vent management   Titration of FiO2 and PEEP, follow SpO2, CXR, blood gasses     Mode: AC/ CMV (Assist Control/ Continuous Mandatory Ventilation)  RR (machine): 12  TV (machine): 500  FiO2: 100  PEEP: 5  ITime: 1  MAP: 11  PIP: 19              ***GI***  [x] NPO  [x]  Pepcid    ***Renal***  Continue to monitor I/Os, BUN/Creatinine.   Replete lytes PRN      ***ID***  No active antibiotic coverage      ***Endocrine***  [x] Stress Hyperglycemia: HbA1c 5.8%                - [x] Insulin gtt               - Need tight glycemic control to prevent wound infection.          Patient requires continuous monitoring with bedside rhythm monitoring, arterial line, pulse oximetry, ventilator monitoring; intermittent blood gas analysis. Care plan discussed with ICU care team. Patient remains critical; required more than usual ICU care.     Care Plan discussed with the ICU care team. Patient remained critical, at risk for life threatening decompensation.     By signing my name below, IRenny, attest that this documentation has been prepared under the direction and in the presence of Jeanine Peralta MD.  Electronically signed: Bib Barrett, 11-03-22 @ 19:35    IFabian, personally performed the services described in this documentation. all medical record entries made by the scribe were at my direction and in my presence. I have reviewed the chart and agree that the record reflects my personal performance and is accurate and complete  Electronically signed: Jeanine Peralta MD.    critical Care Time: 30 min

## 2022-11-03 NOTE — PATIENT PROFILE ADULT - VISION (WITH CORRECTIVE LENSES IF THE PATIENT USUALLY WEARS THEM):
Uses glasses/Partially impaired: cannot see medication labels or newsprint, but can see obstacles in path, and the surrounding layout; can count fingers at arm's length

## 2022-11-03 NOTE — BRIEF OPERATIVE NOTE - NS MD BRIEF OP PACING WIRES
Discharge Instructions for Total Hip Replacement Surgery  You had a hip replacement surgery. This means your natural hip was replaced with an artificial joint (prosthesis). You may be recovering at home or in a rehabilitation facility. Either way, you must take care of your new hip. Do this by moving and sitting the way you were taught in the hospital. Also, be sure to see your doctor for follow-up visits, and return to activity slowly. Because a total hip replacement is major surgery, it will be a few months before you can move comfortably.  Home care  · Take your pain medicine exactly as directed.  · Dont drive until your doctor says its OK. And never drive while taking opioid pain medicine.  · Wear the support stockings you were given in the hospital. Wear them for 24 hours a day for 3 to 4 week(s).  · To relieve discomfort at night, get up and move around.  · Tell all your healthcare providers--including your dentist--about your artificial joint before any procedure. You may need to take antibiotics before dental work and other medical procedures to reduce the risk of infection.  · Arrange to have your staples removed 2 week(s) after surgery. The staples were used to close the skin incision.  Incision care  · Check your incision daily for redness, swelling, tenderness, or drainage.  · Avoid infection by washing your hands often. If an infection occurs, it will need to be treated immediately. So call your doctor right away if you think you may have an infection. Symptoms include a fever or an incision that leaks white, green, or yellow fluid.  · Avoid soaking your incision in water (no hot tubs, bathtubs, swimming pools) until your doctor says its OK.  · Wait 5 to 7 days after your surgery to start showering. Then shower as needed. Carefully wash your incision with soap and water. Gently pat it dry. Dont rub the incision, or apply creams or lotions to it. And to avoid falling when showering, sit on a shower  stool.  Sitting and sleeping  · Dont sit for more than 30 to 45 minutes at a time.  · Use chairs with arms, and sit with your knees slightly lower than your hips. Dont sit on low or sagging chairs or couches.  · Dont lean forward while sitting.  · Dont cross your legs.  · Keep your feet flat on the floor. Dont turn your foot or leg inward. This stresses your hip joint.  · Use an elevated toilet seat for 6 weeks after surgery.  · Ask your healthcare provider if its OK to sleep on your stomach or on the side that has the new hip. Use pillows between your legs when sleeping on your back or on your side.  · Sit on a firm cushion when you ride in a car and avoid sitting too low. Try not to bend your hip too much when getting in and out of the car.  Moving safely  · Dont bend at the hip when you bend over. Don't bend at the waist to put on socks and shoes. And avoid picking up items from the floor.  · Use a cane, crutches, a walker, or handrails until your balance, flexibility, and strength improve. And remember to ask for help from others when you need it.  · Free up your hands so that you can use them to keep balance. Use a sunday pack, apron, or pockets to carry things.  · Follow your doctors orders regarding how much weight to put on the affected leg.  · Walk often and do prescribed exercises as instructed.  · Arrange your household to keep the items you need within reach.  · Remove electrical cords, throw rugs, and anything else that may cause you to fall.  · Use nonslip bath mats, grab bars, an elevated toilet seat, and a shower chair in your bathroom.  Follow-up  Make a follow-up appointment as directed by your doctor.     When to seek medical attention  Call 911 right away if you have any of the following:  · Chest pain  · Shortness of breath  Otherwise, call your doctor right away if you have any of the following:  · Increased hip pain  · Pain or swelling in your calf or leg  · Fever above 100.4°F (38°C)  or shaking chills  · Excessive swelling, increased drainage or redness around the incision  · Swelling, tenderness, or cramps in your leg   Date Last Reviewed: 11/15/2017  © 2172-9262 [employer]. [address] [place] [address], [place] [address]. All rights reserved. This information is not intended as a substitute for professional medical care. Always follow your healthcare professional's instructions.         Ventricular

## 2022-11-03 NOTE — PRE-ANESTHESIA EVALUATION ADULT - NSANTHPMHFT_GEN_ALL_CORE
62 year old man with history of HTN, HLD, ROCHELLE on CPAP, former 20py smoker (quit 20 years ago), denies COPD, CAD s/p multiple stents and brachytherapy to RCA stent, with instent restenosis    plavix last taken Monday    LHC: severe in stent restenosis of the LAD and the diagonal branches. LAD 90% stenosis and previously placed stent is a drug-eluting stent and is partially occluded. First diagonal 90% stenosis and previously placed stent is a drug-eluting stent and is partially occluded. There is mild non obstructive disease of the LCx. The RCA has moderate in stent restenosis of the mid RCA which recently had brachytherapy. There is 60% stenosis.  TTE: normal LVEF

## 2022-11-04 DIAGNOSIS — Z95.1 PRESENCE OF AORTOCORONARY BYPASS GRAFT: ICD-10-CM

## 2022-11-04 LAB
ALBUMIN SERPL ELPH-MCNC: 4.6 G/DL — SIGNIFICANT CHANGE UP (ref 3.3–5)
ALP SERPL-CCNC: 26 U/L — LOW (ref 40–120)
ALT FLD-CCNC: 14 U/L — SIGNIFICANT CHANGE UP (ref 10–45)
ANION GAP SERPL CALC-SCNC: 16 MMOL/L — SIGNIFICANT CHANGE UP (ref 5–17)
AST SERPL-CCNC: 32 U/L — SIGNIFICANT CHANGE UP (ref 10–40)
BASE EXCESS BLDA CALC-SCNC: -2.7 MMOL/L — LOW (ref -2–3)
BILIRUB SERPL-MCNC: 0.8 MG/DL — SIGNIFICANT CHANGE UP (ref 0.2–1.2)
BUN SERPL-MCNC: 15 MG/DL — SIGNIFICANT CHANGE UP (ref 7–23)
CALCIUM SERPL-MCNC: 8.4 MG/DL — SIGNIFICANT CHANGE UP (ref 8.4–10.5)
CHLORIDE SERPL-SCNC: 104 MMOL/L — SIGNIFICANT CHANGE UP (ref 96–108)
CO2 BLDA-SCNC: 24 MMOL/L — SIGNIFICANT CHANGE UP (ref 19–24)
CO2 SERPL-SCNC: 21 MMOL/L — LOW (ref 22–31)
CREAT SERPL-MCNC: 0.81 MG/DL — SIGNIFICANT CHANGE UP (ref 0.5–1.3)
EGFR: 100 ML/MIN/1.73M2 — SIGNIFICANT CHANGE UP
GAS PNL BLDA: SIGNIFICANT CHANGE UP
GLUCOSE BLDC GLUCOMTR-MCNC: 104 MG/DL — HIGH (ref 70–99)
GLUCOSE BLDC GLUCOMTR-MCNC: 104 MG/DL — HIGH (ref 70–99)
GLUCOSE BLDC GLUCOMTR-MCNC: 121 MG/DL — HIGH (ref 70–99)
GLUCOSE BLDC GLUCOMTR-MCNC: 123 MG/DL — HIGH (ref 70–99)
GLUCOSE BLDC GLUCOMTR-MCNC: 129 MG/DL — HIGH (ref 70–99)
GLUCOSE BLDC GLUCOMTR-MCNC: 134 MG/DL — HIGH (ref 70–99)
GLUCOSE BLDC GLUCOMTR-MCNC: 138 MG/DL — HIGH (ref 70–99)
GLUCOSE BLDC GLUCOMTR-MCNC: 141 MG/DL — HIGH (ref 70–99)
GLUCOSE BLDC GLUCOMTR-MCNC: 145 MG/DL — HIGH (ref 70–99)
GLUCOSE BLDC GLUCOMTR-MCNC: 152 MG/DL — HIGH (ref 70–99)
GLUCOSE BLDC GLUCOMTR-MCNC: 163 MG/DL — HIGH (ref 70–99)
GLUCOSE BLDC GLUCOMTR-MCNC: 171 MG/DL — HIGH (ref 70–99)
GLUCOSE BLDC GLUCOMTR-MCNC: 96 MG/DL — SIGNIFICANT CHANGE UP (ref 70–99)
GLUCOSE SERPL-MCNC: 144 MG/DL — HIGH (ref 70–99)
HCO3 BLDA-SCNC: 23 MMOL/L — SIGNIFICANT CHANGE UP (ref 21–28)
HCT VFR BLD CALC: 29.6 % — LOW (ref 39–50)
HGB BLD-MCNC: 10.6 G/DL — LOW (ref 13–17)
HOROWITZ INDEX BLDA+IHG-RTO: 40 — SIGNIFICANT CHANGE UP
MAGNESIUM SERPL-MCNC: 2.2 MG/DL — SIGNIFICANT CHANGE UP (ref 1.6–2.6)
MCHC RBC-ENTMCNC: 30.6 PG — SIGNIFICANT CHANGE UP (ref 27–34)
MCHC RBC-ENTMCNC: 35.8 GM/DL — SIGNIFICANT CHANGE UP (ref 32–36)
MCV RBC AUTO: 85.5 FL — SIGNIFICANT CHANGE UP (ref 80–100)
NRBC # BLD: 0 /100 WBCS — SIGNIFICANT CHANGE UP (ref 0–0)
PCO2 BLDA: 40 MMHG — SIGNIFICANT CHANGE UP (ref 35–48)
PH BLDA: 7.36 — SIGNIFICANT CHANGE UP (ref 7.35–7.45)
PHOSPHATE SERPL-MCNC: 2.6 MG/DL — SIGNIFICANT CHANGE UP (ref 2.5–4.5)
PLATELET # BLD AUTO: 172 K/UL — SIGNIFICANT CHANGE UP (ref 150–400)
PO2 BLDA: 96 MMHG — SIGNIFICANT CHANGE UP (ref 83–108)
POTASSIUM SERPL-MCNC: 3.9 MMOL/L — SIGNIFICANT CHANGE UP (ref 3.5–5.3)
POTASSIUM SERPL-SCNC: 3.9 MMOL/L — SIGNIFICANT CHANGE UP (ref 3.5–5.3)
PROT SERPL-MCNC: 6.2 G/DL — SIGNIFICANT CHANGE UP (ref 6–8.3)
RBC # BLD: 3.46 M/UL — LOW (ref 4.2–5.8)
RBC # FLD: 12 % — SIGNIFICANT CHANGE UP (ref 10.3–14.5)
SAO2 % BLDA: 98.3 % — HIGH (ref 94–98)
SODIUM SERPL-SCNC: 141 MMOL/L — SIGNIFICANT CHANGE UP (ref 135–145)
WBC # BLD: 9.18 K/UL — SIGNIFICANT CHANGE UP (ref 3.8–10.5)
WBC # FLD AUTO: 9.18 K/UL — SIGNIFICANT CHANGE UP (ref 3.8–10.5)

## 2022-11-04 PROCEDURE — 93010 ELECTROCARDIOGRAM REPORT: CPT

## 2022-11-04 PROCEDURE — 71045 X-RAY EXAM CHEST 1 VIEW: CPT | Mod: 26

## 2022-11-04 RX ORDER — ONDANSETRON 8 MG/1
4 TABLET, FILM COATED ORAL EVERY 6 HOURS
Refills: 0 | Status: DISCONTINUED | OUTPATIENT
Start: 2022-11-04 | End: 2022-11-08

## 2022-11-04 RX ORDER — HYDROMORPHONE HYDROCHLORIDE 2 MG/ML
0.5 INJECTION INTRAMUSCULAR; INTRAVENOUS; SUBCUTANEOUS ONCE
Refills: 0 | Status: DISCONTINUED | OUTPATIENT
Start: 2022-11-04 | End: 2022-11-04

## 2022-11-04 RX ORDER — ACETAMINOPHEN 500 MG
1000 TABLET ORAL ONCE
Refills: 0 | Status: COMPLETED | OUTPATIENT
Start: 2022-11-04 | End: 2022-11-04

## 2022-11-04 RX ORDER — FENTANYL CITRATE 50 UG/ML
25 INJECTION INTRAVENOUS ONCE
Refills: 0 | Status: DISCONTINUED | OUTPATIENT
Start: 2022-11-04 | End: 2022-11-04

## 2022-11-04 RX ORDER — PANTOPRAZOLE SODIUM 20 MG/1
40 TABLET, DELAYED RELEASE ORAL
Refills: 0 | Status: DISCONTINUED | OUTPATIENT
Start: 2022-11-04 | End: 2022-11-08

## 2022-11-04 RX ORDER — HYDROMORPHONE HYDROCHLORIDE 2 MG/ML
0.5 INJECTION INTRAMUSCULAR; INTRAVENOUS; SUBCUTANEOUS
Refills: 0 | Status: DISCONTINUED | OUTPATIENT
Start: 2022-11-04 | End: 2022-11-05

## 2022-11-04 RX ORDER — NALOXONE HYDROCHLORIDE 4 MG/.1ML
0.1 SPRAY NASAL
Refills: 0 | Status: DISCONTINUED | OUTPATIENT
Start: 2022-11-04 | End: 2022-11-08

## 2022-11-04 RX ORDER — HYDROMORPHONE HYDROCHLORIDE 2 MG/ML
0.5 INJECTION INTRAMUSCULAR; INTRAVENOUS; SUBCUTANEOUS ONCE
Refills: 0 | Status: DISCONTINUED | OUTPATIENT
Start: 2022-11-04 | End: 2022-11-03

## 2022-11-04 RX ORDER — POTASSIUM CHLORIDE 20 MEQ
10 PACKET (EA) ORAL
Refills: 0 | Status: COMPLETED | OUTPATIENT
Start: 2022-11-04 | End: 2022-11-04

## 2022-11-04 RX ORDER — ATORVASTATIN CALCIUM 80 MG/1
80 TABLET, FILM COATED ORAL AT BEDTIME
Refills: 0 | Status: DISCONTINUED | OUTPATIENT
Start: 2022-11-04 | End: 2022-11-08

## 2022-11-04 RX ORDER — NICARDIPINE HYDROCHLORIDE 30 MG/1
5 CAPSULE, EXTENDED RELEASE ORAL
Qty: 40 | Refills: 0 | Status: DISCONTINUED | OUTPATIENT
Start: 2022-11-04 | End: 2022-11-04

## 2022-11-04 RX ORDER — INSULIN LISPRO 100/ML
VIAL (ML) SUBCUTANEOUS
Refills: 0 | Status: DISCONTINUED | OUTPATIENT
Start: 2022-11-04 | End: 2022-11-08

## 2022-11-04 RX ORDER — HYDROMORPHONE HYDROCHLORIDE 2 MG/ML
30 INJECTION INTRAMUSCULAR; INTRAVENOUS; SUBCUTANEOUS
Refills: 0 | Status: DISCONTINUED | OUTPATIENT
Start: 2022-11-04 | End: 2022-11-05

## 2022-11-04 RX ORDER — METOPROLOL TARTRATE 50 MG
25 TABLET ORAL
Refills: 0 | Status: DISCONTINUED | OUTPATIENT
Start: 2022-11-04 | End: 2022-11-06

## 2022-11-04 RX ORDER — SODIUM BICARBONATE 1 MEQ/ML
50 SYRINGE (ML) INTRAVENOUS ONCE
Refills: 0 | Status: COMPLETED | OUTPATIENT
Start: 2022-11-04 | End: 2022-11-04

## 2022-11-04 RX ORDER — ENOXAPARIN SODIUM 100 MG/ML
40 INJECTION SUBCUTANEOUS EVERY 24 HOURS
Refills: 0 | Status: DISCONTINUED | OUTPATIENT
Start: 2022-11-04 | End: 2022-11-08

## 2022-11-04 RX ORDER — ASPIRIN/CALCIUM CARB/MAGNESIUM 324 MG
81 TABLET ORAL ONCE
Refills: 0 | Status: COMPLETED | OUTPATIENT
Start: 2022-11-04 | End: 2022-11-04

## 2022-11-04 RX ADMIN — Medication 100 MILLIEQUIVALENT(S): at 02:00

## 2022-11-04 RX ADMIN — Medication 125 MILLILITER(S): at 00:10

## 2022-11-04 RX ADMIN — GABAPENTIN 100 MILLIGRAM(S): 400 CAPSULE ORAL at 06:18

## 2022-11-04 RX ADMIN — AMIODARONE HYDROCHLORIDE 400 MILLIGRAM(S): 400 TABLET ORAL at 17:00

## 2022-11-04 RX ADMIN — Medication 10 MILLIGRAM(S): at 21:55

## 2022-11-04 RX ADMIN — Medication 100 MILLIEQUIVALENT(S): at 00:00

## 2022-11-04 RX ADMIN — Medication 50 MILLIEQUIVALENT(S): at 03:48

## 2022-11-04 RX ADMIN — Medication 10 MILLIGRAM(S): at 13:56

## 2022-11-04 RX ADMIN — FENTANYL CITRATE 25 MICROGRAM(S): 50 INJECTION INTRAVENOUS at 10:59

## 2022-11-04 RX ADMIN — HYDROMORPHONE HYDROCHLORIDE 0.5 MILLIGRAM(S): 2 INJECTION INTRAMUSCULAR; INTRAVENOUS; SUBCUTANEOUS at 05:00

## 2022-11-04 RX ADMIN — Medication 81 MILLIGRAM(S): at 04:47

## 2022-11-04 RX ADMIN — PANTOPRAZOLE SODIUM 40 MILLIGRAM(S): 20 TABLET, DELAYED RELEASE ORAL at 11:01

## 2022-11-04 RX ADMIN — OXYCODONE HYDROCHLORIDE 10 MILLIGRAM(S): 5 TABLET ORAL at 14:57

## 2022-11-04 RX ADMIN — Medication 400 MILLIGRAM(S): at 06:00

## 2022-11-04 RX ADMIN — HYDROMORPHONE HYDROCHLORIDE 30 MILLILITER(S): 2 INJECTION INTRAMUSCULAR; INTRAVENOUS; SUBCUTANEOUS at 19:02

## 2022-11-04 RX ADMIN — GABAPENTIN 100 MILLIGRAM(S): 400 CAPSULE ORAL at 21:55

## 2022-11-04 RX ADMIN — Medication 50 MILLIEQUIVALENT(S): at 04:48

## 2022-11-04 RX ADMIN — ONDANSETRON 4 MILLIGRAM(S): 8 TABLET, FILM COATED ORAL at 19:49

## 2022-11-04 RX ADMIN — Medication 25 MILLIGRAM(S): at 09:05

## 2022-11-04 RX ADMIN — AMIODARONE HYDROCHLORIDE 400 MILLIGRAM(S): 400 TABLET ORAL at 06:17

## 2022-11-04 RX ADMIN — Medication 50 MILLIEQUIVALENT(S): at 02:47

## 2022-11-04 RX ADMIN — Medication 650 MILLIGRAM(S): at 11:38

## 2022-11-04 RX ADMIN — Medication 650 MILLIGRAM(S): at 17:07

## 2022-11-04 RX ADMIN — Medication 100 MILLIEQUIVALENT(S): at 01:29

## 2022-11-04 RX ADMIN — Medication 100 MILLIGRAM(S): at 10:59

## 2022-11-04 RX ADMIN — Medication 2: at 21:54

## 2022-11-04 RX ADMIN — HYDROMORPHONE HYDROCHLORIDE 0.5 MILLIGRAM(S): 2 INJECTION INTRAMUSCULAR; INTRAVENOUS; SUBCUTANEOUS at 04:48

## 2022-11-04 RX ADMIN — Medication 650 MILLIGRAM(S): at 11:01

## 2022-11-04 RX ADMIN — OXYCODONE HYDROCHLORIDE 10 MILLIGRAM(S): 5 TABLET ORAL at 13:57

## 2022-11-04 RX ADMIN — HYDROMORPHONE HYDROCHLORIDE 30 MILLILITER(S): 2 INJECTION INTRAMUSCULAR; INTRAVENOUS; SUBCUTANEOUS at 16:39

## 2022-11-04 RX ADMIN — ENOXAPARIN SODIUM 40 MILLIGRAM(S): 100 INJECTION SUBCUTANEOUS at 11:02

## 2022-11-04 RX ADMIN — GABAPENTIN 100 MILLIGRAM(S): 400 CAPSULE ORAL at 13:57

## 2022-11-04 RX ADMIN — SENNA PLUS 2 TABLET(S): 8.6 TABLET ORAL at 21:54

## 2022-11-04 RX ADMIN — Medication 10 MILLIGRAM(S): at 06:09

## 2022-11-04 RX ADMIN — FAMOTIDINE 20 MILLIGRAM(S): 10 INJECTION INTRAVENOUS at 06:18

## 2022-11-04 RX ADMIN — Medication 400 MILLIGRAM(S): at 22:46

## 2022-11-04 RX ADMIN — CHLORHEXIDINE GLUCONATE 1 APPLICATION(S): 213 SOLUTION TOPICAL at 11:08

## 2022-11-04 RX ADMIN — Medication 500 MILLIGRAM(S): at 17:01

## 2022-11-04 RX ADMIN — Medication 400 MILLIGRAM(S): at 01:30

## 2022-11-04 RX ADMIN — Medication 650 MILLIGRAM(S): at 17:01

## 2022-11-04 RX ADMIN — ATORVASTATIN CALCIUM 80 MILLIGRAM(S): 80 TABLET, FILM COATED ORAL at 21:55

## 2022-11-04 RX ADMIN — Medication 2: at 17:01

## 2022-11-04 RX ADMIN — Medication 25 MILLIGRAM(S): at 17:01

## 2022-11-04 RX ADMIN — HYDROMORPHONE HYDROCHLORIDE 0.5 MILLIGRAM(S): 2 INJECTION INTRAMUSCULAR; INTRAVENOUS; SUBCUTANEOUS at 02:45

## 2022-11-04 RX ADMIN — Medication 50 MILLIEQUIVALENT(S): at 01:50

## 2022-11-04 RX ADMIN — OXYCODONE HYDROCHLORIDE 5 MILLIGRAM(S): 5 TABLET ORAL at 09:00

## 2022-11-04 RX ADMIN — OXYCODONE HYDROCHLORIDE 5 MILLIGRAM(S): 5 TABLET ORAL at 08:02

## 2022-11-04 RX ADMIN — Medication 100 MILLIGRAM(S): at 02:11

## 2022-11-04 RX ADMIN — FENTANYL CITRATE 25 MICROGRAM(S): 50 INJECTION INTRAVENOUS at 10:30

## 2022-11-04 RX ADMIN — POLYETHYLENE GLYCOL 3350 17 GRAM(S): 17 POWDER, FOR SOLUTION ORAL at 11:02

## 2022-11-04 RX ADMIN — HYDROMORPHONE HYDROCHLORIDE 0.5 MILLIGRAM(S): 2 INJECTION INTRAMUSCULAR; INTRAVENOUS; SUBCUTANEOUS at 09:04

## 2022-11-04 RX ADMIN — Medication 81 MILLIGRAM(S): at 11:01

## 2022-11-04 RX ADMIN — Medication 1000 MILLIGRAM(S): at 23:00

## 2022-11-04 RX ADMIN — HYDROMORPHONE HYDROCHLORIDE 0.5 MILLIGRAM(S): 2 INJECTION INTRAMUSCULAR; INTRAVENOUS; SUBCUTANEOUS at 02:30

## 2022-11-04 RX ADMIN — HYDROMORPHONE HYDROCHLORIDE 0.5 MILLIGRAM(S): 2 INJECTION INTRAMUSCULAR; INTRAVENOUS; SUBCUTANEOUS at 10:00

## 2022-11-04 RX ADMIN — Medication 100 MILLIGRAM(S): at 17:02

## 2022-11-04 NOTE — PROGRESS NOTE ADULT - SUBJECTIVE AND OBJECTIVE BOX
VITAL SIGNS    Telemetry:  SR 70-80  Vital Signs Last 24 Hrs  T(C): 36.6 (22 @ 13:09), Max: 36.9 (22 @ 08:00)  T(F): 97.8 (22 @ 13:09), Max: 98.4 (22 @ 08:00)  HR: 81 (22:09) (60 - 87)  BP: 110/56 (22 13:09) (110/56 - 110/56)  RR: 18 (22 13:09) (8 - 27)  SpO2: 96% (22 13:09) (94% - 100%)             @ 07:01  -   @ 07:00  --------------------------------------------------------  IN: 2966.5 mL / OUT: 2095 mL / NET: 871.5 mL     @ 07:01  -   @ 14:01  --------------------------------------------------------  IN: 65 mL / OUT: 587 mL / NET: -522 mL       Daily     Daily Weight in k.4 (2022 00:00)  Admit Wt: Drug Dosing Weight  Height (cm): 175.3 (2022 13:45)  Weight (kg): 87.9 (2022 13:45)  BMI (kg/m2): 28.6 (2022 13:45)  BSA (m2): 2.04 (2022 13:45)    Bilirubin Total, Serum: 0.8 mg/dL ( @ 02:50)  Bilirubin Total, Serum: 0.9 mg/dL ( @ 19:30)    CAPILLARY BLOOD GLUCOSE  121 (2022 08:00)  138 (2022 07:00)  152 (2022 06:00)  123 (2022 05:00)  129 (2022 04:00)  145 (2022 03:00)  134 (2022 02:00)  141 (2022 01:00)      POCT Blood Glucose.: 104 mg/dL (2022 10:40)  POCT Blood Glucose.: 96 mg/dL (2022 10:07)  POCT Blood Glucose.: 104 mg/dL (2022 09:08)  POCT Blood Glucose.: 121 mg/dL (2022 08:05)  POCT Blood Glucose.: 138 mg/dL (2022 07:11)  POCT Blood Glucose.: 152 mg/dL (2022 06:11)  POCT Blood Glucose.: 123 mg/dL (2022 05:04)  POCT Blood Glucose.: 129 mg/dL (2022 04:12)  POCT Blood Glucose.: 145 mg/dL (2022 03:03)  POCT Blood Glucose.: 134 mg/dL (2022 01:58)  POCT Blood Glucose.: 141 mg/dL (2022 01:04)  POCT Blood Glucose.: 179 mg/dL (2022 23:10)          MEDICATIONS  acetaminophen     Tablet .. 650 milliGRAM(s) Oral every 6 hours  aMIOdarone    Tablet 400 milliGRAM(s) Oral two times a day  ascorbic acid 500 milliGRAM(s) Oral two times a day  aspirin enteric coated 81 milliGRAM(s) Oral daily  atorvastatin 80 milliGRAM(s) Oral at bedtime  cefuroxime  IVPB 1500 milliGRAM(s) IV Intermittent every 8 hours  chlorhexidine 2% Cloths 1 Application(s) Topical daily  enoxaparin Injectable 40 milliGRAM(s) SubCutaneous every 24 hours  gabapentin 100 milliGRAM(s) Oral every 8 hours  HYDROmorphone  Injectable 0.5 milliGRAM(s) IV Push every 6 hours PRN  insulin lispro (ADMELOG) corrective regimen sliding scale   SubCutaneous Before meals and at bedtime  insulin regular Infusion 3 Unit(s)/Hr IV Continuous <Continuous>  metoclopramide Injectable 10 milliGRAM(s) IV Push every 8 hours  metoprolol tartrate 25 milliGRAM(s) Oral two times a day  oxyCODONE    IR 5 milliGRAM(s) Oral every 4 hours PRN  oxyCODONE    IR 10 milliGRAM(s) Oral every 4 hours PRN  pantoprazole    Tablet 40 milliGRAM(s) Oral before breakfast  polyethylene glycol 3350 17 Gram(s) Oral daily  senna 2 Tablet(s) Oral at bedtime  sodium chloride 0.9%. 1000 milliLiter(s) IV Continuous <Continuous>      >>> <<<  PHYSICAL EXAM  Subjective: " HI, I am still in pain"  Neurology: alert and oriented x 3, nonfocal, no gross deficits  CV : RRR S1S2, no murmurs, rubs or gallops   Sternal Wound :  CDI , Stable  Lungs: CTA B/L, No wheezing, rales, rhonchi. Non labored respirations   Abdomen: soft, NT,ND, ( )BM  :  voiding  Extremities: No LE edema bilaterally, +DP, No calf tenderness     LABS      141  |  104  |  15  ----------------------------<  144<H>  3.9   |  21<L>  |  0.81    Ca    8.4      2022 02:50  Phos  2.6       Mg     2.2         TPro  6.2  /  Alb  4.6  /  TBili  0.8  /  DBili  x   /  AST  32  /  ALT  14  /  AlkPhos  26<L>                                   10.6   9.18  )-----------( 172      ( 2022 02:50 )             29.6          PT/INR - ( 2022 19:30 )   PT: 14.9 sec;   INR: 1.28 ratio         PTT - ( 2022 19:30 )  PTT:32.5 sec       PAST MEDICAL & SURGICAL HISTORY:  HTN (hypertension)      HLD (hyperlipidemia)      CAD (coronary artery disease)      ROCHELLE on CPAP      S/P drug eluting coronary stent placement

## 2022-11-04 NOTE — PHYSICAL THERAPY INITIAL EVALUATION ADULT - ADDITIONAL COMMENTS
Patient lives in private home with spouse, 5 steps to enter 1 flight to bedroom, patient independent with ADLs/IADLs, ambulates without assistive device, +drive, +Works

## 2022-11-04 NOTE — AIRWAY REMOVAL NOTE  ADULT & PEDS - ARTIFICAL AIRWAY REMOVAL COMMENTS
Written order for extubation verified. The patient was identified by full name and birth date compared to the identification band. Present during the procedure was HOWIE Rosenthal

## 2022-11-04 NOTE — PROGRESS NOTE ADULT - SUBJECTIVE AND OBJECTIVE BOX
Patient seen and examined at the bedside.    Remained critically ill on continuous ICU monitoring.    OBJECTIVE:  Vital Signs Last 24 Hrs  T(C): 36.9 (04 Nov 2022 08:00), Max: 36.9 (04 Nov 2022 08:00)  T(F): 98.4 (04 Nov 2022 08:00), Max: 98.4 (04 Nov 2022 08:00)  HR: 80 (04 Nov 2022 08:00) (60 - 87)  BP: 137/83 (03 Nov 2022 13:45) (137/83 - 137/83)  BP(mean): --  RR: 11 (04 Nov 2022 08:00) (8 - 27)  SpO2: 96% (04 Nov 2022 08:00) (94% - 100%)    Parameters below as of 04 Nov 2022 04:00      O2 Concentration (%): 40      Physical Exam:   General: Intubated, multiple lines gtt  Neurology: sedated  Eyes: bilateral pupils equal and reactive   ENT/Neck: Neck supple, trachea midline, No JVD   Respiratory: Clear bilaterally   CV: S1S2, no murmurs        [x] Sternal dressing, [x] Mediastinal CT, [x] Pleural CT,         [x] Sinus rhythm  Abdominal: Soft, NT, ND +BS   Extremities: 1-2+ pedal edema noted, + peripheral pulses   Skin: No Rashes, Hematoma, Ecchymosis                        Assessment:  62 year old male with past medical history of former smoker, HTN, HLD, Low back pain, CAD s/p multiple TRICE (most recent 5/23/22, Had brachytherapy to mid RCA on 6/22/22 ), ROCHELLE on CPAP with complaints of chest pain radiating to bilateral shoulder, occasional shortness of breath and occasional dizziness . Pt. endorses episodes of nonradiating chest pain with SOB/MARY prior to last PCI that have resolved with stent placement but did not feel better after the brachytherapy . He reports that his chest pain is with activities and needs to take rest to resolve the pain. His last chest pain was last week pretty much every day with less intensity. His recent 10/28/22 Cardiac Catheterization revealed LVEF 55%, LT heart catheterization demonstrated severe in stent restenosis of the LAD and the diagonal branches. LAD 90% stenosis and previously placed stent is a drug-eluting stent and is partially occluded. First diagonal 90% stenosis and previously placed stent is a drug-eluting stent and is partially occluded. There is mild non obstructive disease of the LCx. The RCA has moderate in stent restenosis of the mid RCA which recently had brachytherapy. There is 60% stenosis. He is referred by Dr.Ralph Keys with initial evaluation and management for CAD.     CAD s/p CABG x3   Hemodynamic instability  Hypovolemia  Post op respiratory insufficiency  Acute blood loss anemia  Lactic acidosis  Stress hyperglycemia      Today:  - Elevated lactate this morning    Plan:   ***Neuro***  [x] Sedated with [x] Precedex   Post operative neuro assessment     ***Cardiovascular***  Mild AI   Invasive hemodynamic monitoring, assess perfusion indices   SR / Hct 24.0/ Lactate 2.2  Levophed is currently off   Continuos reassessment of hemodynamics   A fib prophylaxis with PO Amiodarone  Lopressor for rate control  Monitor chest tube outputs   [x]  ASA  [x] Statin  Serial EKG and cardiac enzymes     ***Pulmonary***  Post op vent management   Titration of FiO2 and PEEP, follow SpO2, CXR, blood gasses       Mode: CPAP with PS  FiO2: 40  PEEP: 5  PS: 5  MAP: 7  PIP: 11              ***GI***  [x] NPO  [x]  Pepcid  Bowel regimen with Miralax and senna  Reglan for gut motility    ***Renal***  Continue to monitor I/Os, BUN/Creatinine.   Replete lytes PRN      ***ID***  Cefuroxime for perioperative coverage    ***Endocrine***  [x] Stress Hyperglycemia: HbA1c 5.8%                - [x] Insulin gtt               - Need tight glycemic control to prevent wound infection.          Patient requires continuous monitoring with bedside rhythm monitoring, pulse oximetry monitoring, and continuous central venous and arterial pressure monitoring; and intermittent blood gas analysis. Care plan discussed with the ICU care team.   Patient remained critical, at risk for life threatening decompensation.    I have spent 30 minutes providing critical care management to this patient.    By signing my name below, I, Maria D'Amico, attest that this documentation has been prepared under the direction and in the presence of NÉSTOR Alexis  Electronically signed: Maria D'Amico, Scribe, 11-04-22 @ 08:46    I, Roman Branham , personally performed the services described in this documentation. all medical record entries made by the edmaribnakita were at my direction and in my presence. I have reviewed the chart and agree that the record reflects my personal performance and is accurate and complete  Electronically signed: NÉSTOR Alexis Patient seen and examined at the bedside.    Remained critically ill on continuous ICU monitoring.    OBJECTIVE:  Vital Signs Last 24 Hrs  T(C): 36.9 (04 Nov 2022 08:00), Max: 36.9 (04 Nov 2022 08:00)  T(F): 98.4 (04 Nov 2022 08:00), Max: 98.4 (04 Nov 2022 08:00)  HR: 80 (04 Nov 2022 08:00) (60 - 87)  BP: 137/83 (03 Nov 2022 13:45) (137/83 - 137/83)  BP(mean): --  RR: 11 (04 Nov 2022 08:00) (8 - 27)  SpO2: 96% (04 Nov 2022 08:00) (94% - 100%)    Parameters below as of 04 Nov 2022 04:00      O2 Concentration (%): 40      Physical Exam:   General: Intubated, multiple lines gtt  Neurology: sedated  Eyes: bilateral pupils equal and reactive   ENT/Neck: Neck supple, trachea midline, No JVD   Respiratory: Clear bilaterally   CV: S1S2, no murmurs        [x] Sternal dressing, [x] Mediastinal CT, [x] Pleural CT,         [x] Sinus rhythm  Abdominal: Soft, NT, ND +BS   Extremities: 1-2+ pedal edema noted, + peripheral pulses   Skin: No Rashes, Hematoma, Ecchymosis                        Assessment:  62 year old male with past medical history of former smoker, HTN, HLD, Low back pain, CAD s/p multiple TRICE (most recent 5/23/22, Had brachytherapy to mid RCA on 6/22/22 ), ROCHELLE on CPAP with complaints of chest pain radiating to bilateral shoulder, occasional shortness of breath and occasional dizziness . Pt. endorses episodes of nonradiating chest pain with SOB/MARY prior to last PCI that have resolved with stent placement but did not feel better after the brachytherapy . He reports that his chest pain is with activities and needs to take rest to resolve the pain. His last chest pain was last week pretty much every day with less intensity. His recent 10/28/22 Cardiac Catheterization revealed LVEF 55%, LT heart catheterization demonstrated severe in stent restenosis of the LAD and the diagonal branches. LAD 90% stenosis and previously placed stent is a drug-eluting stent and is partially occluded. First diagonal 90% stenosis and previously placed stent is a drug-eluting stent and is partially occluded. There is mild non obstructive disease of the LCx. The RCA has moderate in stent restenosis of the mid RCA which recently had brachytherapy. There is 60% stenosis. He is referred by Dr.Ralph Keys with initial evaluation and management for CAD.     CAD s/p CABG x3   Hemodynamic instability  Hypovolemia  Post op respiratory insufficiency  Acute blood loss anemia  Lactic acidosis  Stress hyperglycemia      Today:  - Extubated overnight  - Started on Lovenox  - Elevated lactate this morning    Plan:   ***Neuro***  [x] Sedated with [x] Precedex - currently off  Post operative neuro assessment     ***Cardiovascular***  Mild AI   Invasive hemodynamic monitoring, assess perfusion indices   SR / Hct 24.0/ Lactate 2.2  [x] Cardene 1.5 mcg  Continuos reassessment of hemodynamics   A fib prophylaxis with PO Amiodarone  Lopressor for rate control  Monitor chest tube outputs   VTE prophylaxis with Lovenox  [x]  ASA  [x] Statin  Serial EKG and cardiac enzymes     ***Pulmonary***  Extubated overnight  4L NC  Titration of FiO2, follow SpO2, CXR, blood gasses     Mode: CPAP with PS  FiO2: 40  PEEP: 5  PS: 5  MAP: 7  PIP: 11              ***GI***  [x] NPO  [x]  Pepcid  Bowel regimen with Miralax and senna  Reglan for gut motility    ***Renal***  Continue to monitor I/Os, BUN/Creatinine.   Replete lytes PRN      ***ID***  Cefuroxime for perioperative coverage    ***Endocrine***  [x] Stress Hyperglycemia: HbA1c 5.8%                - [x] Insulin gtt               - Need tight glycemic control to prevent wound infection.          Patient requires continuous monitoring with bedside rhythm monitoring, pulse oximetry monitoring, and continuous central venous and arterial pressure monitoring; and intermittent blood gas analysis. Care plan discussed with the ICU care team.   Patient remained critical, at risk for life threatening decompensation.    I have spent 30 minutes providing critical care management to this patient.    By signing my name below, I, Maria D'Amico, attest that this documentation has been prepared under the direction and in the presence of NÉSTOR Alexis  Electronically signed: Maria D'Amico, Scribe, 11-04-22 @ 08:46    Roman WONG , personally performed the services described in this documentation. all medical record entries made by the scribe were at my direction and in my presence. I have reviewed the chart and agree that the record reflects my personal performance and is accurate and complete  Electronically signed: NÉSTOR Alexis Patient seen and examined at the bedside.    Remained critically ill on continuous ICU monitoring.    OBJECTIVE:  Vital Signs Last 24 Hrs  T(C): 36.9 (04 Nov 2022 08:00), Max: 36.9 (04 Nov 2022 08:00)  T(F): 98.4 (04 Nov 2022 08:00), Max: 98.4 (04 Nov 2022 08:00)  HR: 80 (04 Nov 2022 08:00) (60 - 87)  BP: 137/83 (03 Nov 2022 13:45) (137/83 - 137/83)  BP(mean): --  RR: 11 (04 Nov 2022 08:00) (8 - 27)  SpO2: 96% (04 Nov 2022 08:00) (94% - 100%)    Parameters below as of 04 Nov 2022 04:00      O2 Concentration (%): 40      Physical Exam:   General: Moderate amount of pain at CT sites, OOBTC  Neurology: Non-focal. Ambulating, Moves all extremities.   Eyes: bilateral pupils equal and reactive   ENT/Neck: Neck supple, trachea midline, No JVD   Respiratory: Clear bilaterally   CV: S1S2, no murmurs        [x] Sternal dressing, [x] Mediastinal CT, [x] Pleural CT,         [x] Sinus rhythm  Abdominal: Soft, NT, ND +BS   Extremities: trace pedal edema noted, + peripheral pulses   Skin: No Rashes, Hematoma, Ecchymosis                        Assessment:  62 year old male with past medical history of former smoker, HTN, HLD, Low back pain, CAD s/p multiple TRICE (most recent 5/23/22, Had brachytherapy to mid RCA on 6/22/22 ), ROCHELLE on CPAP with complaints of chest pain radiating to bilateral shoulder, occasional shortness of breath and occasional dizziness . Pt. endorses episodes of nonradiating chest pain with SOB/MARY prior to last PCI that have resolved with stent placement but did not feel better after the brachytherapy . He reports that his chest pain is with activities and needs to take rest to resolve the pain. His last chest pain was last week pretty much every day with less intensity. His recent 10/28/22 Cardiac Catheterization revealed LVEF 55%, LT heart catheterization demonstrated severe in stent restenosis of the LAD and the diagonal branches. LAD 90% stenosis and previously placed stent is a drug-eluting stent and is partially occluded. First diagonal 90% stenosis and previously placed stent is a drug-eluting stent and is partially occluded. There is mild non obstructive disease of the LCx. The RCA has moderate in stent restenosis of the mid RCA which recently had brachytherapy. There is 60% stenosis. He is referred by Dr.Ralph Keys with initial evaluation and management for CAD.     CAD s/p CABG x3   Hemodynamic instability  Hypovolemia  Post op respiratory insufficiency  Acute blood loss anemia  Lactic acidosis  Stress hyperglycemia      Today:  - Extubated overnight to NC   - Ambulation this morning   - No products  - ASA, LVX, statin, BB this morning to wean off of Cardene  - Plan to move to SDU with all lines and tubes    Plan:   ***Neuro***  Intact neurologically   Ambulating   Post operative neuro assessment     ***Cardiovascular***  Mild AI   Invasive hemodynamic monitoring, assess perfusion indices   SR / Hct 24.0/ Lactate 2.2  [x] Cardene 1.5 mcg  Continuos reassessment of hemodynamics   A fib prophylaxis with PO Amiodarone  Lopressor for rate control and BP control   Monitor chest tube outputs   VTE prophylaxis with Lovenox  [x]  ASA  [x] Statin    ***Pulmonary***  Extubated overnight  4L NC  Titration of FiO2, follow SpO2, CXR, blood gasses             ***GI***  [x] Advancing to consistent carb diet   [x]  Pepcid for GI ppx   Bowel regimen with Miralax and senna  Reglan for gut motility    ***Renal***  Continue to monitor I/Os, BUN/Creatinine.   Replete lytes PRN  Adequate urine output     ***ID***  Cefuroxime for perioperative coverage    ***Endocrine***  [x] Stress Hyperglycemia: HbA1c 5.8%                - [x] Insulin gtt  transitioning to ISS              - Need tight glycemic control to prevent wound infection.          Patient requires continuous monitoring with bedside rhythm monitoring, pulse oximetry monitoring, and continuous central venous and arterial pressure monitoring; and intermittent blood gas analysis. Care plan discussed with the ICU care team.   Patient remained critical, at risk for life threatening decompensation.    I have spent 40 minutes providing critical care management to this patient.    By signing my name below, I, Maria D'Amico, attest that this documentation has been prepared under the direction and in the presence of NÉSTOR Alexis  Electronically signed: Maria D'Amico, Scribe, 11-04-22 @ 08:46    I, Roman Branham , personally performed the services described in this documentation. all medical record entries made by the scribe were at my direction and in my presence. I have reviewed the chart and agree that the record reflects my personal performance and is accurate and complete  Electronically signed: NÉSTOR Alexis

## 2022-11-04 NOTE — PHYSICAL THERAPY INITIAL EVALUATION ADULT - PLANNED THERAPY INTERVENTIONS, PT EVAL
stair negotiation GOAL: Patient will negotiate up / down 1 flight of stairs independently in 2 weeks/bed mobility training/gait training/transfer training

## 2022-11-04 NOTE — PHYSICAL THERAPY INITIAL EVALUATION ADULT - PERTINENT HX OF CURRENT PROBLEM, REHAB EVAL
62 year old male with past medical history of former smoker, HTN, HLD, Low back pain, CAD s/p multiple TRICE (most recent 5/23/22, Had brachytherapy to mid RCA on 6/22/22 ), ROCHELLE on CPAP with complaints of chest pain radiating to bilateral shoulder, occasional shortness of breath and occasional dizziness recent cath showing multivessel disease

## 2022-11-05 LAB
ALBUMIN SERPL ELPH-MCNC: 4.1 G/DL — SIGNIFICANT CHANGE UP (ref 3.3–5)
ALP SERPL-CCNC: 28 U/L — LOW (ref 40–120)
ALT FLD-CCNC: 12 U/L — SIGNIFICANT CHANGE UP (ref 10–45)
ANION GAP SERPL CALC-SCNC: 10 MMOL/L — SIGNIFICANT CHANGE UP (ref 5–17)
AST SERPL-CCNC: 24 U/L — SIGNIFICANT CHANGE UP (ref 10–40)
BILIRUB SERPL-MCNC: 0.4 MG/DL — SIGNIFICANT CHANGE UP (ref 0.2–1.2)
BUN SERPL-MCNC: 20 MG/DL — SIGNIFICANT CHANGE UP (ref 7–23)
CALCIUM SERPL-MCNC: 8.6 MG/DL — SIGNIFICANT CHANGE UP (ref 8.4–10.5)
CHLORIDE SERPL-SCNC: 100 MMOL/L — SIGNIFICANT CHANGE UP (ref 96–108)
CO2 SERPL-SCNC: 26 MMOL/L — SIGNIFICANT CHANGE UP (ref 22–31)
CREAT SERPL-MCNC: 0.85 MG/DL — SIGNIFICANT CHANGE UP (ref 0.5–1.3)
EGFR: 98 ML/MIN/1.73M2 — SIGNIFICANT CHANGE UP
GLUCOSE BLDC GLUCOMTR-MCNC: 115 MG/DL — HIGH (ref 70–99)
GLUCOSE BLDC GLUCOMTR-MCNC: 152 MG/DL — HIGH (ref 70–99)
GLUCOSE BLDC GLUCOMTR-MCNC: 160 MG/DL — HIGH (ref 70–99)
GLUCOSE BLDC GLUCOMTR-MCNC: 161 MG/DL — HIGH (ref 70–99)
GLUCOSE BLDC GLUCOMTR-MCNC: 181 MG/DL — HIGH (ref 70–99)
GLUCOSE SERPL-MCNC: 169 MG/DL — HIGH (ref 70–99)
HCT VFR BLD CALC: 28.7 % — LOW (ref 39–50)
HGB BLD-MCNC: 9.5 G/DL — LOW (ref 13–17)
MAGNESIUM SERPL-MCNC: 2.3 MG/DL — SIGNIFICANT CHANGE UP (ref 1.6–2.6)
MCHC RBC-ENTMCNC: 30.2 PG — SIGNIFICANT CHANGE UP (ref 27–34)
MCHC RBC-ENTMCNC: 33.1 GM/DL — SIGNIFICANT CHANGE UP (ref 32–36)
MCV RBC AUTO: 91.1 FL — SIGNIFICANT CHANGE UP (ref 80–100)
NRBC # BLD: 0 /100 WBCS — SIGNIFICANT CHANGE UP (ref 0–0)
PHOSPHATE SERPL-MCNC: 2.9 MG/DL — SIGNIFICANT CHANGE UP (ref 2.5–4.5)
PLATELET # BLD AUTO: 187 K/UL — SIGNIFICANT CHANGE UP (ref 150–400)
POTASSIUM SERPL-MCNC: 4.7 MMOL/L — SIGNIFICANT CHANGE UP (ref 3.5–5.3)
POTASSIUM SERPL-SCNC: 4.7 MMOL/L — SIGNIFICANT CHANGE UP (ref 3.5–5.3)
PROT SERPL-MCNC: 6.4 G/DL — SIGNIFICANT CHANGE UP (ref 6–8.3)
RBC # BLD: 3.15 M/UL — LOW (ref 4.2–5.8)
RBC # FLD: 12.4 % — SIGNIFICANT CHANGE UP (ref 10.3–14.5)
SODIUM SERPL-SCNC: 136 MMOL/L — SIGNIFICANT CHANGE UP (ref 135–145)
WBC # BLD: 16.53 K/UL — HIGH (ref 3.8–10.5)
WBC # FLD AUTO: 16.53 K/UL — HIGH (ref 3.8–10.5)

## 2022-11-05 PROCEDURE — 71045 X-RAY EXAM CHEST 1 VIEW: CPT | Mod: 26

## 2022-11-05 RX ADMIN — Medication 2: at 11:44

## 2022-11-05 RX ADMIN — Medication 650 MILLIGRAM(S): at 17:21

## 2022-11-05 RX ADMIN — Medication 2: at 07:29

## 2022-11-05 RX ADMIN — Medication 500 MILLIGRAM(S): at 17:20

## 2022-11-05 RX ADMIN — Medication 650 MILLIGRAM(S): at 17:20

## 2022-11-05 RX ADMIN — Medication 25 MILLIGRAM(S): at 05:24

## 2022-11-05 RX ADMIN — Medication 100 MILLIGRAM(S): at 07:43

## 2022-11-05 RX ADMIN — AMIODARONE HYDROCHLORIDE 400 MILLIGRAM(S): 400 TABLET ORAL at 05:24

## 2022-11-05 RX ADMIN — Medication 81 MILLIGRAM(S): at 07:46

## 2022-11-05 RX ADMIN — Medication 25 MILLIGRAM(S): at 17:21

## 2022-11-05 RX ADMIN — GABAPENTIN 100 MILLIGRAM(S): 400 CAPSULE ORAL at 13:33

## 2022-11-05 RX ADMIN — POLYETHYLENE GLYCOL 3350 17 GRAM(S): 17 POWDER, FOR SOLUTION ORAL at 07:43

## 2022-11-05 RX ADMIN — ATORVASTATIN CALCIUM 80 MILLIGRAM(S): 80 TABLET, FILM COATED ORAL at 21:20

## 2022-11-05 RX ADMIN — CHLORHEXIDINE GLUCONATE 1 APPLICATION(S): 213 SOLUTION TOPICAL at 07:47

## 2022-11-05 RX ADMIN — ENOXAPARIN SODIUM 40 MILLIGRAM(S): 100 INJECTION SUBCUTANEOUS at 07:43

## 2022-11-05 RX ADMIN — AMIODARONE HYDROCHLORIDE 400 MILLIGRAM(S): 400 TABLET ORAL at 17:21

## 2022-11-05 RX ADMIN — Medication 100 MILLIGRAM(S): at 03:01

## 2022-11-05 RX ADMIN — Medication 650 MILLIGRAM(S): at 07:28

## 2022-11-05 RX ADMIN — OXYCODONE HYDROCHLORIDE 10 MILLIGRAM(S): 5 TABLET ORAL at 17:18

## 2022-11-05 RX ADMIN — Medication 2: at 21:34

## 2022-11-05 RX ADMIN — Medication 10 MILLIGRAM(S): at 05:24

## 2022-11-05 RX ADMIN — Medication 650 MILLIGRAM(S): at 07:30

## 2022-11-05 RX ADMIN — GABAPENTIN 100 MILLIGRAM(S): 400 CAPSULE ORAL at 05:24

## 2022-11-05 RX ADMIN — GABAPENTIN 100 MILLIGRAM(S): 400 CAPSULE ORAL at 21:20

## 2022-11-05 RX ADMIN — HYDROMORPHONE HYDROCHLORIDE 30 MILLILITER(S): 2 INJECTION INTRAMUSCULAR; INTRAVENOUS; SUBCUTANEOUS at 07:02

## 2022-11-05 RX ADMIN — OXYCODONE HYDROCHLORIDE 10 MILLIGRAM(S): 5 TABLET ORAL at 16:28

## 2022-11-05 RX ADMIN — SENNA PLUS 2 TABLET(S): 8.6 TABLET ORAL at 21:22

## 2022-11-05 RX ADMIN — Medication 500 MILLIGRAM(S): at 05:24

## 2022-11-05 RX ADMIN — OXYCODONE HYDROCHLORIDE 5 MILLIGRAM(S): 5 TABLET ORAL at 11:58

## 2022-11-05 RX ADMIN — OXYCODONE HYDROCHLORIDE 5 MILLIGRAM(S): 5 TABLET ORAL at 10:56

## 2022-11-05 RX ADMIN — PANTOPRAZOLE SODIUM 40 MILLIGRAM(S): 20 TABLET, DELAYED RELEASE ORAL at 05:24

## 2022-11-05 NOTE — OCCUPATIONAL THERAPY INITIAL EVALUATION ADULT - TRANSFER TRAINING, PT EVAL
pt will perform toilet transfer with I in 4 weeks / pt will perform shower transfer to shower chair with I in 4 weeks

## 2022-11-05 NOTE — OCCUPATIONAL THERAPY INITIAL EVALUATION ADULT - LEVEL OF INDEPENDENCE: DRESS UPPER BODY, OT EVAL
educated on how to perform UB dressing while maintaining sternal precautions. pt with understanding/independent

## 2022-11-05 NOTE — PROGRESS NOTE ADULT - SUBJECTIVE AND OBJECTIVE BOX
VITAL SIGNS    Telemetry:  SR 70-80's  Vital Signs Last 24 Hrs  T(C): 36.8 (22 @ 03:04), Max: 36.8 (22 @ 03:04)  T(F): 98.2 (22 @ 03:04), Max: 98.2 (22 @ 03:04)  HR: 71 (22 07:52) (71 - 83)  BP: 101/59 (22 07:52) (91/53 - 996/535)  RR: 18 (22 @ 03:04) (12 - 18)  SpO2: 91% (22 @ 07:52) (91% - 96%)             @ 07:01  -   @ 07:00  --------------------------------------------------------  IN: 1225 mL / OUT: 2557 mL / NET: -1332 mL     @ 08:01  -   @ 10:35  --------------------------------------------------------  IN: 330 mL / OUT: 0 mL / NET: 330 mL       Daily     Daily Weight in k.1 (2022 07:00)  Admit Wt: Drug Dosing Weight  Height (cm): 175.3 (2022 13:45)  Weight (kg): 87.9 (2022 13:45)  BMI (kg/m2): 28.6 (2022 13:45)  BSA (m2): 2.04 (2022 13:45)    Bilirubin Total, Serum: 0.4 mg/dL ( @ 06:16)    CAPILLARY BLOOD GLUCOSE      POCT Blood Glucose.: 181 mg/dL (2022 07:15)  POCT Blood Glucose.: 171 mg/dL (2022 21:32)  POCT Blood Glucose.: 163 mg/dL (2022 16:24)  POCT Blood Glucose.: 104 mg/dL (2022 10:40)          MEDICATIONS  acetaminophen     Tablet .. 650 milliGRAM(s) Oral every 6 hours  aMIOdarone    Tablet 400 milliGRAM(s) Oral two times a day  ascorbic acid 500 milliGRAM(s) Oral two times a day  aspirin enteric coated 81 milliGRAM(s) Oral daily  atorvastatin 80 milliGRAM(s) Oral at bedtime  bisacodyl Suppository 10 milliGRAM(s) Rectal once  chlorhexidine 2% Cloths 1 Application(s) Topical daily  enoxaparin Injectable 40 milliGRAM(s) SubCutaneous every 24 hours  gabapentin 100 milliGRAM(s) Oral every 8 hours  HYDROmorphone  Injectable 0.5 milliGRAM(s) IV Push every 6 hours PRN  insulin lispro (ADMELOG) corrective regimen sliding scale   SubCutaneous Before meals and at bedtime  metoprolol tartrate 25 milliGRAM(s) Oral two times a day  naloxone Injectable 0.1 milliGRAM(s) IV Push every 3 minutes PRN  ondansetron Injectable 4 milliGRAM(s) IV Push every 6 hours PRN  oxyCODONE    IR 5 milliGRAM(s) Oral every 4 hours PRN  oxyCODONE    IR 10 milliGRAM(s) Oral every 4 hours PRN  pantoprazole    Tablet 40 milliGRAM(s) Oral before breakfast  polyethylene glycol 3350 17 Gram(s) Oral daily  senna 2 Tablet(s) Oral at bedtime      >>> <<<  PHYSICAL EXAM  Subjective: " HI, I feel better today"  Neurology: alert and oriented x 3, nonfocal, no gross deficits  CV : RRR S1S2, no murmurs, rubs or gallops   Sternal Wound :  CDI , Stable  Lungs: CTA B/L, No wheezing, rales, rhonchi. Non labored respirations   Abdomen: soft, NT, ND, ( )BM  :  voiding  Extremities: No LE edema bilaterally, +DP, No calf tenderness     LABS      136  |  100  |  20  ----------------------------<  169<H>  4.7   |  26  |  0.85    Ca    8.6      2022 06:16  Phos  2.9     11  Mg     2.3     -    TPro  6.4  /  Alb  4.1  /  TBili  0.4  /  DBili  x   /  AST  24  /  ALT  12  /  AlkPhos  28<L>  11                                 9.5    16.53 )-----------( 187      ( 2022 06:16 )             28.7          PT/INR - ( 2022 19:30 )   PT: 14.9 sec;   INR: 1.28 ratio         PTT - ( 2022 19:30 )  PTT:32.5 sec       PAST MEDICAL & SURGICAL HISTORY:  HTN (hypertension)      HLD (hyperlipidemia)      CAD (coronary artery disease)      ROCHELLE on CPAP      S/P drug eluting coronary stent placement

## 2022-11-05 NOTE — OCCUPATIONAL THERAPY INITIAL EVALUATION ADULT - LEVEL OF INDEPENDENCE, OT EVAL
educated on how to perform bathing task while maintaining sternal precautions. pt with understanding

## 2022-11-05 NOTE — OCCUPATIONAL THERAPY INITIAL EVALUATION ADULT - ADDITIONAL COMMENTS
Patient lives in private home with spouse, 5 steps to enter 1 flight to bedroom, patient independent with ADLs/IADLs, ambulates without assistive device, +drive, +Works Patient lives in private home with spouse, 5 steps to enter 1 flight to bedroom, patient independent with ADLs/IADLs, ambulates without assistive device, +drive, +works

## 2022-11-05 NOTE — PROGRESS NOTE ADULT - SUBJECTIVE AND OBJECTIVE BOX
Day 2 of Anesthesia Pain Management Service    SUBJECTIVE: Patient is doing well with IV PCA    Pain Scale Score:	[X] Refer to charted pain scores    THERAPY:    [ ] IV PCA Morphine		[ ] 5 mg/mL	[ ] 1 mg/mL  [X] IV PCA Hydromorphone	[ ] 5 mg/mL	[X] 1 mg/mL  [ ] IV PCA Fentanyl		[ ] 50 micrograms/mL    Demand dose: 0.2 mg     Lockout: 6 minutes   Continuous Rate: 0 mg/hr  4 Hour Limit: 4 mg    MEDICATIONS  (STANDING):  acetaminophen     Tablet .. 650 milliGRAM(s) Oral every 6 hours  aMIOdarone    Tablet 400 milliGRAM(s) Oral two times a day  ascorbic acid 500 milliGRAM(s) Oral two times a day  aspirin enteric coated 81 milliGRAM(s) Oral daily  atorvastatin 80 milliGRAM(s) Oral at bedtime  bisacodyl Suppository 10 milliGRAM(s) Rectal once  chlorhexidine 2% Cloths 1 Application(s) Topical daily  enoxaparin Injectable 40 milliGRAM(s) SubCutaneous every 24 hours  gabapentin 100 milliGRAM(s) Oral every 8 hours  insulin lispro (ADMELOG) corrective regimen sliding scale   SubCutaneous Before meals and at bedtime  metoprolol tartrate 25 milliGRAM(s) Oral two times a day  pantoprazole    Tablet 40 milliGRAM(s) Oral before breakfast  polyethylene glycol 3350 17 Gram(s) Oral daily  senna 2 Tablet(s) Oral at bedtime    MEDICATIONS  (PRN):  HYDROmorphone  Injectable 0.5 milliGRAM(s) IV Push every 6 hours PRN Breakthrough Pain  naloxone Injectable 0.1 milliGRAM(s) IV Push every 3 minutes PRN For ANY of the following changes in patient status:  A. RR LESS THAN 10 breaths per minute, B. Oxygen saturation LESS THAN 90%, C. Sedation score of 6  ondansetron Injectable 4 milliGRAM(s) IV Push every 6 hours PRN Nausea  oxyCODONE    IR 5 milliGRAM(s) Oral every 4 hours PRN Moderate Pain (4 - 6)  oxyCODONE    IR 10 milliGRAM(s) Oral every 4 hours PRN Severe Pain (7 - 10)      OBJECTIVE:    Sedation Score:	[ X] Alert	[ ] Drowsy 	[ ] Arousable	[ ] Asleep	[ ] Unresponsive    Side Effects:	[X ] None	[ ] Nausea	[ ] Vomiting	[ ] Pruritus  		[ ] Other:    Vital Signs Last 24 Hrs  T(C): 36.8 (05 Nov 2022 03:04), Max: 36.8 (05 Nov 2022 03:04)  T(F): 98.2 (05 Nov 2022 03:04), Max: 98.2 (05 Nov 2022 03:04)  HR: 72 (05 Nov 2022 10:38) (71 - 83)  BP: 105/65 (05 Nov 2022 10:38) (91/53 - 996/535)  BP(mean): 87 (05 Nov 2022 03:04) (67 - 87)  RR: 18 (05 Nov 2022 03:04) (18 - 18)  SpO2: 91% (05 Nov 2022 10:38) (91% - 96%)    Parameters below as of 05 Nov 2022 10:38  Patient On (Oxygen Delivery Method): nasal cannula  O2 Flow (L/min): 4      ASSESSMENT/ PLAN    Therapy to  be:               [ ] Continued   [x ] Discontinued   [ x] Changed to PRN Analgesics    Documentation and Verification of current medications:   [X] Done	[ ] Not done, not eligible    Comments:

## 2022-11-05 NOTE — OCCUPATIONAL THERAPY INITIAL EVALUATION ADULT - PERTINENT HX OF CURRENT PROBLEM, REHAB EVAL
62 year old male with past medical history of former smoker, HTN, HLD, Low back pain, CAD s/p multiple TRICE (most recent 5/23/22, Had brachytherapy to mid RCA on 6/22/22 ), ROCHELLE on CPAP with complaints of chest pain radiating to bilateral shoulder, occasional shortness of breath and occasional dizziness recent cath showing multivessel disease. now s/p CABG x 3 (LIMA-LAD | SVG-Ramus | SVG-PDA) 11/3

## 2022-11-06 LAB
ALBUMIN SERPL ELPH-MCNC: 4 G/DL — SIGNIFICANT CHANGE UP (ref 3.3–5)
ALP SERPL-CCNC: 37 U/L — LOW (ref 40–120)
ALT FLD-CCNC: 14 U/L — SIGNIFICANT CHANGE UP (ref 10–45)
ANION GAP SERPL CALC-SCNC: 9 MMOL/L — SIGNIFICANT CHANGE UP (ref 5–17)
AST SERPL-CCNC: 20 U/L — SIGNIFICANT CHANGE UP (ref 10–40)
BILIRUB SERPL-MCNC: 0.5 MG/DL — SIGNIFICANT CHANGE UP (ref 0.2–1.2)
BUN SERPL-MCNC: 23 MG/DL — SIGNIFICANT CHANGE UP (ref 7–23)
CALCIUM SERPL-MCNC: 9.2 MG/DL — SIGNIFICANT CHANGE UP (ref 8.4–10.5)
CHLORIDE SERPL-SCNC: 101 MMOL/L — SIGNIFICANT CHANGE UP (ref 96–108)
CO2 SERPL-SCNC: 29 MMOL/L — SIGNIFICANT CHANGE UP (ref 22–31)
CREAT SERPL-MCNC: 0.96 MG/DL — SIGNIFICANT CHANGE UP (ref 0.5–1.3)
EGFR: 89 ML/MIN/1.73M2 — SIGNIFICANT CHANGE UP
GLUCOSE BLDC GLUCOMTR-MCNC: 127 MG/DL — HIGH (ref 70–99)
GLUCOSE BLDC GLUCOMTR-MCNC: 129 MG/DL — HIGH (ref 70–99)
GLUCOSE BLDC GLUCOMTR-MCNC: 138 MG/DL — HIGH (ref 70–99)
GLUCOSE BLDC GLUCOMTR-MCNC: 152 MG/DL — HIGH (ref 70–99)
GLUCOSE SERPL-MCNC: 137 MG/DL — HIGH (ref 70–99)
HCT VFR BLD CALC: 28.4 % — LOW (ref 39–50)
HGB BLD-MCNC: 9.6 G/DL — LOW (ref 13–17)
MCHC RBC-ENTMCNC: 30.2 PG — SIGNIFICANT CHANGE UP (ref 27–34)
MCHC RBC-ENTMCNC: 33.8 GM/DL — SIGNIFICANT CHANGE UP (ref 32–36)
MCV RBC AUTO: 89.3 FL — SIGNIFICANT CHANGE UP (ref 80–100)
NRBC # BLD: 0 /100 WBCS — SIGNIFICANT CHANGE UP (ref 0–0)
PLATELET # BLD AUTO: 174 K/UL — SIGNIFICANT CHANGE UP (ref 150–400)
POTASSIUM SERPL-MCNC: 4.8 MMOL/L — SIGNIFICANT CHANGE UP (ref 3.5–5.3)
POTASSIUM SERPL-SCNC: 4.8 MMOL/L — SIGNIFICANT CHANGE UP (ref 3.5–5.3)
PROT SERPL-MCNC: 6.6 G/DL — SIGNIFICANT CHANGE UP (ref 6–8.3)
RBC # BLD: 3.18 M/UL — LOW (ref 4.2–5.8)
RBC # FLD: 12.1 % — SIGNIFICANT CHANGE UP (ref 10.3–14.5)
SODIUM SERPL-SCNC: 139 MMOL/L — SIGNIFICANT CHANGE UP (ref 135–145)
WBC # BLD: 14.12 K/UL — HIGH (ref 3.8–10.5)
WBC # FLD AUTO: 14.12 K/UL — HIGH (ref 3.8–10.5)

## 2022-11-06 PROCEDURE — 71045 X-RAY EXAM CHEST 1 VIEW: CPT | Mod: 26

## 2022-11-06 RX ORDER — FUROSEMIDE 40 MG
20 TABLET ORAL DAILY
Refills: 0 | Status: DISCONTINUED | OUTPATIENT
Start: 2022-11-07 | End: 2022-11-08

## 2022-11-06 RX ORDER — SPIRONOLACTONE 25 MG/1
25 TABLET, FILM COATED ORAL DAILY
Refills: 0 | Status: DISCONTINUED | OUTPATIENT
Start: 2022-11-07 | End: 2022-11-08

## 2022-11-06 RX ORDER — METOPROLOL TARTRATE 50 MG
25 TABLET ORAL EVERY 8 HOURS
Refills: 0 | Status: DISCONTINUED | OUTPATIENT
Start: 2022-11-06 | End: 2022-11-07

## 2022-11-06 RX ADMIN — Medication 650 MILLIGRAM(S): at 06:30

## 2022-11-06 RX ADMIN — OXYCODONE HYDROCHLORIDE 5 MILLIGRAM(S): 5 TABLET ORAL at 17:40

## 2022-11-06 RX ADMIN — Medication 25 MILLIGRAM(S): at 17:05

## 2022-11-06 RX ADMIN — ATORVASTATIN CALCIUM 80 MILLIGRAM(S): 80 TABLET, FILM COATED ORAL at 21:26

## 2022-11-06 RX ADMIN — Medication 25 MILLIGRAM(S): at 21:26

## 2022-11-06 RX ADMIN — PANTOPRAZOLE SODIUM 40 MILLIGRAM(S): 20 TABLET, DELAYED RELEASE ORAL at 05:21

## 2022-11-06 RX ADMIN — SENNA PLUS 2 TABLET(S): 8.6 TABLET ORAL at 21:26

## 2022-11-06 RX ADMIN — CHLORHEXIDINE GLUCONATE 1 APPLICATION(S): 213 SOLUTION TOPICAL at 11:57

## 2022-11-06 RX ADMIN — GABAPENTIN 100 MILLIGRAM(S): 400 CAPSULE ORAL at 05:20

## 2022-11-06 RX ADMIN — Medication 81 MILLIGRAM(S): at 12:01

## 2022-11-06 RX ADMIN — GABAPENTIN 100 MILLIGRAM(S): 400 CAPSULE ORAL at 21:26

## 2022-11-06 RX ADMIN — GABAPENTIN 100 MILLIGRAM(S): 400 CAPSULE ORAL at 13:30

## 2022-11-06 RX ADMIN — AMIODARONE HYDROCHLORIDE 400 MILLIGRAM(S): 400 TABLET ORAL at 05:21

## 2022-11-06 RX ADMIN — Medication 500 MILLIGRAM(S): at 17:06

## 2022-11-06 RX ADMIN — AMIODARONE HYDROCHLORIDE 400 MILLIGRAM(S): 400 TABLET ORAL at 17:06

## 2022-11-06 RX ADMIN — Medication 650 MILLIGRAM(S): at 12:04

## 2022-11-06 RX ADMIN — Medication 2: at 17:06

## 2022-11-06 RX ADMIN — Medication 25 MILLIGRAM(S): at 05:21

## 2022-11-06 RX ADMIN — OXYCODONE HYDROCHLORIDE 5 MILLIGRAM(S): 5 TABLET ORAL at 17:06

## 2022-11-06 RX ADMIN — ENOXAPARIN SODIUM 40 MILLIGRAM(S): 100 INJECTION SUBCUTANEOUS at 12:00

## 2022-11-06 RX ADMIN — Medication 500 MILLIGRAM(S): at 05:20

## 2022-11-06 RX ADMIN — Medication 650 MILLIGRAM(S): at 12:01

## 2022-11-06 RX ADMIN — POLYETHYLENE GLYCOL 3350 17 GRAM(S): 17 POWDER, FOR SOLUTION ORAL at 12:02

## 2022-11-06 RX ADMIN — Medication 650 MILLIGRAM(S): at 05:20

## 2022-11-06 NOTE — PROGRESS NOTE ADULT - SUBJECTIVE AND OBJECTIVE BOX
VITAL SIGNS    Telemetry:  nsr 70    Vital Signs Last 24 Hrs  T(C): 36.9 (11-06-22 @ 04:38), Max: 36.9 (11-06-22 @ 04:38)  T(F): 98.4 (11-06-22 @ 04:38), Max: 98.4 (11-06-22 @ 04:38)  HR: 66 (11-06-22 @ 07:30) (66 - 81)  BP: 119/74 (11-06-22 @ 04:38) (100/61 - 139/73)  RR: 17 (11-06-22 @ 07:30) (17 - 18)  SpO2: 93% (11-06-22 @ 07:30) (90% - 95%)                   11-05 @ 08:01  -  11-06 @ 07:00  --------------------------------------------------------  IN: 1410 mL / OUT: 2785 mL / NET: -1375 mL    11-06 @ 07:01  -  11-06 @ 11:57  --------------------------------------------------------  IN: 360 mL / OUT: 660 mL / NET: -300 mL          Daily     Daily             CAPILLARY BLOOD GLUCOSE      POCT Blood Glucose.: 129 mg/dL (06 Nov 2022 11:17)  POCT Blood Glucose.: 127 mg/dL (06 Nov 2022 07:31)  POCT Blood Glucose.: 161 mg/dL (05 Nov 2022 21:32)  POCT Blood Glucose.: 115 mg/dL (05 Nov 2022 16:28)  POCT Blood Glucose.: 160 mg/dL (05 Nov 2022 14:21)            Drains:     MS         [x  ] Drainage:                 L Pleural  [ x Drainage:                   Pacing Wires        [ x ]   Settings:                                  Isolated  [  ]    Coumadin    [ ] YES          [x  ]      NO                                   PHYSICAL EXAM        Neurology: alert and oriented x 3, nonfocal, no gross deficits  CV : s1 s2 RRR  Sternal Wound :  CDI , Stable  Lungs: cta  Abdomen: soft, nontender, nondistended, positive bowel sounds, last bowel movement                       chest tubes x 2  :    voidin      Extremities:      -edema   /  -   calve tenderness ,    L leg   incisions cdi          acetaminophen     Tablet .. 650 milliGRAM(s) Oral every 6 hours  acetaminophen     Tablet .. 650 milliGRAM(s) Oral every 6 hours PRN  aMIOdarone    Tablet 400 milliGRAM(s) Oral two times a day  ascorbic acid 500 milliGRAM(s) Oral two times a day  aspirin enteric coated 81 milliGRAM(s) Oral daily  atorvastatin 80 milliGRAM(s) Oral at bedtime  bisacodyl Suppository 10 milliGRAM(s) Rectal once  chlorhexidine 2% Cloths 1 Application(s) Topical daily  enoxaparin Injectable 40 milliGRAM(s) SubCutaneous every 24 hours  gabapentin 100 milliGRAM(s) Oral every 8 hours  insulin lispro (ADMELOG) corrective regimen sliding scale   SubCutaneous Before meals and at bedtime  metoprolol tartrate 25 milliGRAM(s) Oral two times a day  naloxone Injectable 0.1 milliGRAM(s) IV Push every 3 minutes PRN  ondansetron Injectable 4 milliGRAM(s) IV Push every 6 hours PRN  oxyCODONE    IR 5 milliGRAM(s) Oral every 4 hours PRN  oxyCODONE    IR 10 milliGRAM(s) Oral every 4 hours PRN  pantoprazole    Tablet 40 milliGRAM(s) Oral before breakfast  polyethylene glycol 3350 17 Gram(s) Oral daily  senna 2 Tablet(s) Oral at bedtime                    Physical Therapy Rec:   Home  [  ]   Home w/ PT  [  ]  Rehab  [  ]  Discussed with Cardiothoracic Team at AM rounds.

## 2022-11-07 LAB
ALBUMIN SERPL ELPH-MCNC: 4.3 G/DL — SIGNIFICANT CHANGE UP (ref 3.3–5)
ALP SERPL-CCNC: 64 U/L — SIGNIFICANT CHANGE UP (ref 40–120)
ALT FLD-CCNC: 23 U/L — SIGNIFICANT CHANGE UP (ref 10–45)
ANION GAP SERPL CALC-SCNC: 11 MMOL/L — SIGNIFICANT CHANGE UP (ref 5–17)
AST SERPL-CCNC: 26 U/L — SIGNIFICANT CHANGE UP (ref 10–40)
BILIRUB SERPL-MCNC: 0.5 MG/DL — SIGNIFICANT CHANGE UP (ref 0.2–1.2)
BUN SERPL-MCNC: 26 MG/DL — HIGH (ref 7–23)
CALCIUM SERPL-MCNC: 9.7 MG/DL — SIGNIFICANT CHANGE UP (ref 8.4–10.5)
CHLORIDE SERPL-SCNC: 100 MMOL/L — SIGNIFICANT CHANGE UP (ref 96–108)
CO2 SERPL-SCNC: 28 MMOL/L — SIGNIFICANT CHANGE UP (ref 22–31)
CREAT SERPL-MCNC: 1.06 MG/DL — SIGNIFICANT CHANGE UP (ref 0.5–1.3)
EGFR: 79 ML/MIN/1.73M2 — SIGNIFICANT CHANGE UP
GLUCOSE BLDC GLUCOMTR-MCNC: 104 MG/DL — HIGH (ref 70–99)
GLUCOSE BLDC GLUCOMTR-MCNC: 104 MG/DL — HIGH (ref 70–99)
GLUCOSE BLDC GLUCOMTR-MCNC: 132 MG/DL — HIGH (ref 70–99)
GLUCOSE BLDC GLUCOMTR-MCNC: 134 MG/DL — HIGH (ref 70–99)
GLUCOSE SERPL-MCNC: 110 MG/DL — HIGH (ref 70–99)
HCT VFR BLD CALC: 32.8 % — LOW (ref 39–50)
HGB BLD-MCNC: 10.9 G/DL — LOW (ref 13–17)
MCHC RBC-ENTMCNC: 30.2 PG — SIGNIFICANT CHANGE UP (ref 27–34)
MCHC RBC-ENTMCNC: 33.2 GM/DL — SIGNIFICANT CHANGE UP (ref 32–36)
MCV RBC AUTO: 90.9 FL — SIGNIFICANT CHANGE UP (ref 80–100)
NRBC # BLD: 0 /100 WBCS — SIGNIFICANT CHANGE UP (ref 0–0)
PLATELET # BLD AUTO: 255 K/UL — SIGNIFICANT CHANGE UP (ref 150–400)
POTASSIUM SERPL-MCNC: 4.7 MMOL/L — SIGNIFICANT CHANGE UP (ref 3.5–5.3)
POTASSIUM SERPL-SCNC: 4.7 MMOL/L — SIGNIFICANT CHANGE UP (ref 3.5–5.3)
PROT SERPL-MCNC: 7 G/DL — SIGNIFICANT CHANGE UP (ref 6–8.3)
RBC # BLD: 3.61 M/UL — LOW (ref 4.2–5.8)
RBC # FLD: 12.1 % — SIGNIFICANT CHANGE UP (ref 10.3–14.5)
SODIUM SERPL-SCNC: 139 MMOL/L — SIGNIFICANT CHANGE UP (ref 135–145)
WBC # BLD: 11.6 K/UL — HIGH (ref 3.8–10.5)
WBC # FLD AUTO: 11.6 K/UL — HIGH (ref 3.8–10.5)

## 2022-11-07 RX ORDER — METOPROLOL TARTRATE 50 MG
75 TABLET ORAL DAILY
Refills: 0 | Status: DISCONTINUED | OUTPATIENT
Start: 2022-11-07 | End: 2022-11-08

## 2022-11-07 RX ADMIN — SENNA PLUS 2 TABLET(S): 8.6 TABLET ORAL at 21:04

## 2022-11-07 RX ADMIN — ATORVASTATIN CALCIUM 80 MILLIGRAM(S): 80 TABLET, FILM COATED ORAL at 21:04

## 2022-11-07 RX ADMIN — Medication 75 MILLIGRAM(S): at 17:36

## 2022-11-07 RX ADMIN — Medication 25 MILLIGRAM(S): at 05:19

## 2022-11-07 RX ADMIN — GABAPENTIN 100 MILLIGRAM(S): 400 CAPSULE ORAL at 21:04

## 2022-11-07 RX ADMIN — Medication 25 MILLIGRAM(S): at 13:26

## 2022-11-07 RX ADMIN — OXYCODONE HYDROCHLORIDE 5 MILLIGRAM(S): 5 TABLET ORAL at 10:05

## 2022-11-07 RX ADMIN — PANTOPRAZOLE SODIUM 40 MILLIGRAM(S): 20 TABLET, DELAYED RELEASE ORAL at 05:19

## 2022-11-07 RX ADMIN — GABAPENTIN 100 MILLIGRAM(S): 400 CAPSULE ORAL at 13:26

## 2022-11-07 RX ADMIN — Medication 20 MILLIGRAM(S): at 05:19

## 2022-11-07 RX ADMIN — Medication 81 MILLIGRAM(S): at 11:53

## 2022-11-07 RX ADMIN — SPIRONOLACTONE 25 MILLIGRAM(S): 25 TABLET, FILM COATED ORAL at 05:19

## 2022-11-07 RX ADMIN — GABAPENTIN 100 MILLIGRAM(S): 400 CAPSULE ORAL at 05:18

## 2022-11-07 RX ADMIN — OXYCODONE HYDROCHLORIDE 5 MILLIGRAM(S): 5 TABLET ORAL at 11:16

## 2022-11-07 RX ADMIN — ENOXAPARIN SODIUM 40 MILLIGRAM(S): 100 INJECTION SUBCUTANEOUS at 11:53

## 2022-11-07 RX ADMIN — Medication 500 MILLIGRAM(S): at 05:18

## 2022-11-07 RX ADMIN — Medication 500 MILLIGRAM(S): at 17:35

## 2022-11-07 NOTE — PROGRESS NOTE ADULT - PROBLEM SELECTOR PLAN 1
Continue with ASA, Statin, BB  Titrate up Lop 25BID as tolerated   Preop on ASA/Plavix - resume plavix?  Cough and deep breathe, Incentive Spirometry Q1h, Chest PT.  Ambulate 4x daily as tolerated and with PT.    C/W GI prophylaxis on protonix and DVT prophylaxis on SQ LVX
Continue with ASA, Statin, BB  Titrate up Lop 25BID as tolerated   Preop on ASA/Plavix - resume plavix?  +PW VVI   Monitor chest tube drainage   Cough and deep breathe, Incentive Spirometry Q1h, Chest PT.  Ambulate 4x daily as tolerated and with PT.    C/W GI prophylaxis on protonix and DVT prophylaxis on SQ LVX

## 2022-11-07 NOTE — PROGRESS NOTE ADULT - ASSESSMENT
62 year old male with past medical history of former smoker, HTN, HLD, Low back pain, CAD s/p multiple TRICE (most recent 5/23/22, Had brachytherapy to mid RCA on 6/22/22 ), ROCHELLE on CPAP with complaints of chest pain radiating to bilateral shoulder, occasional shortness of breath and occasional dizziness . Pt. endorses episodes of nonradiating chest pain with SOB/MARY prior to last PCI that have resolved with stent placement but did not feel better after the brachytherapy . He reports that his chest pain is with activities and needs to take rest to resolve the pain. His last chest pain was last week pretty much every day with less intensity. His recent 10/28/22 Cardiac Catheterization revealed LVEF 55%, LT heart catheterization demonstrated severe in stent restenosis of the LAD and the diagonal branches. LAD 90% stenosis and previously placed stent is a drug-eluting stent and is partially occluded. First diagonal 90% stenosis and previously placed stent is a drug-eluting stent and is partially occluded. There is mild non obstructive disease of the LCx. The RCA has moderate in stent restenosis of the mid RCA which recently had brachytherapy. There is 60% stenosis. He is referred by Dr.Ralph Keys with initial evaluation and management for CAD.     11/3 CABG x3 L  11/4 off Cardene gtt, started on BB. Transfer to SDU with Med and L Pleural CT in pace   11/5 VSS; YANIRA/Ligia removed. Maintain chest tubes -LWS. Continue to monitor chest tube drainage. PCA d'cd   11/6 d/c chest tubes as drainage decreases  Increase ambulation, wean O2  11/7 VSS - rounds made w/ Dr. Alvarado - will d/c CT's after ambulation. - d/c pw's later - possible d/c home tomorrow    
62 year old male with past medical history of former smoker, HTN, HLD, Low back pain, CAD s/p multiple TRICE (most recent 5/23/22, Had brachytherapy to mid RCA on 6/22/22 ), ROCHELLE on CPAP with complaints of chest pain radiating to bilateral shoulder, occasional shortness of breath and occasional dizziness . Pt. endorses episodes of nonradiating chest pain with SOB/MARY prior to last PCI that have resolved with stent placement but did not feel better after the brachytherapy . He reports that his chest pain is with activities and needs to take rest to resolve the pain. His last chest pain was last week pretty much every day with less intensity. His recent 10/28/22 Cardiac Catheterization revealed LVEF 55%, LT heart catheterization demonstrated severe in stent restenosis of the LAD and the diagonal branches. LAD 90% stenosis and previously placed stent is a drug-eluting stent and is partially occluded. First diagonal 90% stenosis and previously placed stent is a drug-eluting stent and is partially occluded. There is mild non obstructive disease of the LCx. The RCA has moderate in stent restenosis of the mid RCA which recently had brachytherapy. There is 60% stenosis. He is referred by Dr.Ralph Keys with initial evaluation and management for CAD.     11/3 CABG x3   11/4 off Cardene gtt, started on BB. Transfer to SDU with Med and L Pleural CT in pace         
62 year old male with past medical history of former smoker, HTN, HLD, Low back pain, CAD s/p multiple TRICE (most recent 5/23/22, Had brachytherapy to mid RCA on 6/22/22 ), ROCHELLE on CPAP with complaints of chest pain radiating to bilateral shoulder, occasional shortness of breath and occasional dizziness . Pt. endorses episodes of nonradiating chest pain with SOB/MARY prior to last PCI that have resolved with stent placement but did not feel better after the brachytherapy . He reports that his chest pain is with activities and needs to take rest to resolve the pain. His last chest pain was last week pretty much every day with less intensity. His recent 10/28/22 Cardiac Catheterization revealed LVEF 55%, LT heart catheterization demonstrated severe in stent restenosis of the LAD and the diagonal branches. LAD 90% stenosis and previously placed stent is a drug-eluting stent and is partially occluded. First diagonal 90% stenosis and previously placed stent is a drug-eluting stent and is partially occluded. There is mild non obstructive disease of the LCx. The RCA has moderate in stent restenosis of the mid RCA which recently had brachytherapy. There is 60% stenosis. He is referred by Dr.Ralph Keys with initial evaluation and management for CAD.     11/3 CABG x3 L  11/4 off Cardene gtt, started on BB. Transfer to SDU with Med and L Pleural CT in pace   11/5 VSS; YANIRA/Ligia removed. Maintain chest tubes -LWS. Continue to monitor chest tube drainage. PCA d'cd   11/6 d/c chest tubes as drainage decreases  Increase ambulation, wean O2      
62 year old male with past medical history of former smoker, HTN, HLD, Low back pain, CAD s/p multiple TRICE (most recent 5/23/22, Had brachytherapy to mid RCA on 6/22/22 ), ROCHELLE on CPAP with complaints of chest pain radiating to bilateral shoulder, occasional shortness of breath and occasional dizziness . Pt. endorses episodes of nonradiating chest pain with SOB/MARY prior to last PCI that have resolved with stent placement but did not feel better after the brachytherapy . He reports that his chest pain is with activities and needs to take rest to resolve the pain. His last chest pain was last week pretty much every day with less intensity. His recent 10/28/22 Cardiac Catheterization revealed LVEF 55%, LT heart catheterization demonstrated severe in stent restenosis of the LAD and the diagonal branches. LAD 90% stenosis and previously placed stent is a drug-eluting stent and is partially occluded. First diagonal 90% stenosis and previously placed stent is a drug-eluting stent and is partially occluded. There is mild non obstructive disease of the LCx. The RCA has moderate in stent restenosis of the mid RCA which recently had brachytherapy. There is 60% stenosis. He is referred by Dr.Ralph Keys with initial evaluation and management for CAD.     11/3 CABG x3   11/4 off Cardene gtt, started on BB. Transfer to SDU with Med and L Pleural CT in pace   11/5 VSS; YANIRA/Ligia removed. Maintain chest tubes -LWS. Continue to monitor chest tube drainage. PCA d'cd

## 2022-11-07 NOTE — PROGRESS NOTE ADULT - SUBJECTIVE AND OBJECTIVE BOX
VITAL SIGNS-Telemetry:  SR 60-90  Vital Signs Last 24 Hrs  T(C): 36.8 (22 @ 04:29), Max: 36.8 (22 @ 04:29)  T(F): 98.2 (22 @ 04:29), Max: 98.2 (22 @ 04:29)  HR: 73 (22 @ 04:29) (73 - 83)  BP: 109/64 (22 @ 04:29) (109/64 - 129/81)  RR: 18 (22 @ 04:29) (16 - 18)  SpO2: 93% (22 @ 04:29) (92% - 94%)          @ 07:01  -   @ 07:00  --------------------------------------------------------  IN: 1540 mL / OUT: 3445 mL / NET: -1905 mL     @ 07:01  -   @ 09:15  --------------------------------------------------------  IN: 0 mL / OUT: 400 mL / NET: -400 mL    Daily     Daily Weight in k.4 (2022 08:00)    CAPILLARY BLOOD GLUCOSE  POCT Blood Glucose.: 104 mg/dL (2022 07:26)  POCT Blood Glucose.: 138 mg/dL (2022 21:22)  POCT Blood Glucose.: 152 mg/dL (2022 16:36)  POCT Blood Glucose.: 129 mg/dL (2022 11:17)             PHYSICAL EXAM:  Neurology: alert and oriented x 3, nonfocal, no gross deficits  CV : S1S2  Sternal Wound :  CDI , Stable  Lungs: CTA  Abdomen: soft, nontender, nondistended, positive bowel sounds, last bowel movement    preop     Extremities:     b/l ace wraps - b/l leg inc no calf tenderness  trace edema    acetaminophen     Tablet .. 650 milliGRAM(s) Oral every 6 hours PRN  ascorbic acid 500 milliGRAM(s) Oral two times a day  aspirin enteric coated 81 milliGRAM(s) Oral daily  atorvastatin 80 milliGRAM(s) Oral at bedtime  bisacodyl Suppository 10 milliGRAM(s) Rectal once  chlorhexidine 2% Cloths 1 Application(s) Topical daily  enoxaparin Injectable 40 milliGRAM(s) SubCutaneous every 24 hours  furosemide    Tablet 20 milliGRAM(s) Oral daily  gabapentin 100 milliGRAM(s) Oral every 8 hours  insulin lispro (ADMELOG) corrective regimen sliding scale   SubCutaneous Before meals and at bedtime  metoprolol tartrate 25 milliGRAM(s) Oral every 8 hours  naloxone Injectable 0.1 milliGRAM(s) IV Push every 3 minutes PRN  ondansetron Injectable 4 milliGRAM(s) IV Push every 6 hours PRN  oxyCODONE    IR 5 milliGRAM(s) Oral every 4 hours PRN  oxyCODONE    IR 10 milliGRAM(s) Oral every 4 hours PRN  pantoprazole    Tablet 40 milliGRAM(s) Oral before breakfast  polyethylene glycol 3350 17 Gram(s) Oral daily  senna 2 Tablet(s) Oral at bedtime  spironolactone 25 milliGRAM(s) Oral daily    Physical Therapy Rec:   Home  [x  ]   Home w/ PT  [  ]  Rehab  [  ]  Discussed with Cardiothoracic Team at AM rounds.

## 2022-11-08 ENCOUNTER — TRANSCRIPTION ENCOUNTER (OUTPATIENT)
Age: 62
End: 2022-11-08

## 2022-11-08 VITALS
TEMPERATURE: 98 F | HEART RATE: 81 BPM | RESPIRATION RATE: 18 BRPM | OXYGEN SATURATION: 94 % | DIASTOLIC BLOOD PRESSURE: 66 MMHG | SYSTOLIC BLOOD PRESSURE: 103 MMHG

## 2022-11-08 LAB
ALBUMIN SERPL ELPH-MCNC: 3.8 G/DL — SIGNIFICANT CHANGE UP (ref 3.3–5)
ALP SERPL-CCNC: 59 U/L — SIGNIFICANT CHANGE UP (ref 40–120)
ALT FLD-CCNC: 31 U/L — SIGNIFICANT CHANGE UP (ref 10–45)
ANION GAP SERPL CALC-SCNC: 14 MMOL/L — SIGNIFICANT CHANGE UP (ref 5–17)
AST SERPL-CCNC: 21 U/L — SIGNIFICANT CHANGE UP (ref 10–40)
BILIRUB SERPL-MCNC: 0.6 MG/DL — SIGNIFICANT CHANGE UP (ref 0.2–1.2)
BUN SERPL-MCNC: 25 MG/DL — HIGH (ref 7–23)
CALCIUM SERPL-MCNC: 9.3 MG/DL — SIGNIFICANT CHANGE UP (ref 8.4–10.5)
CHLORIDE SERPL-SCNC: 100 MMOL/L — SIGNIFICANT CHANGE UP (ref 96–108)
CO2 SERPL-SCNC: 24 MMOL/L — SIGNIFICANT CHANGE UP (ref 22–31)
CREAT SERPL-MCNC: 0.97 MG/DL — SIGNIFICANT CHANGE UP (ref 0.5–1.3)
EGFR: 88 ML/MIN/1.73M2 — SIGNIFICANT CHANGE UP
GLUCOSE BLDC GLUCOMTR-MCNC: 109 MG/DL — HIGH (ref 70–99)
GLUCOSE BLDC GLUCOMTR-MCNC: 110 MG/DL — HIGH (ref 70–99)
GLUCOSE SERPL-MCNC: 99 MG/DL — SIGNIFICANT CHANGE UP (ref 70–99)
HCT VFR BLD CALC: 34.3 % — LOW (ref 39–50)
HGB BLD-MCNC: 11.5 G/DL — LOW (ref 13–17)
MCHC RBC-ENTMCNC: 29.7 PG — SIGNIFICANT CHANGE UP (ref 27–34)
MCHC RBC-ENTMCNC: 33.5 GM/DL — SIGNIFICANT CHANGE UP (ref 32–36)
MCV RBC AUTO: 88.6 FL — SIGNIFICANT CHANGE UP (ref 80–100)
NRBC # BLD: 0 /100 WBCS — SIGNIFICANT CHANGE UP (ref 0–0)
PLATELET # BLD AUTO: 306 K/UL — SIGNIFICANT CHANGE UP (ref 150–400)
POTASSIUM SERPL-MCNC: 4.3 MMOL/L — SIGNIFICANT CHANGE UP (ref 3.5–5.3)
POTASSIUM SERPL-SCNC: 4.3 MMOL/L — SIGNIFICANT CHANGE UP (ref 3.5–5.3)
PROT SERPL-MCNC: 7 G/DL — SIGNIFICANT CHANGE UP (ref 6–8.3)
RBC # BLD: 3.87 M/UL — LOW (ref 4.2–5.8)
RBC # FLD: 12.1 % — SIGNIFICANT CHANGE UP (ref 10.3–14.5)
SODIUM SERPL-SCNC: 138 MMOL/L — SIGNIFICANT CHANGE UP (ref 135–145)
WBC # BLD: 9.3 K/UL — SIGNIFICANT CHANGE UP (ref 3.8–10.5)
WBC # FLD AUTO: 9.3 K/UL — SIGNIFICANT CHANGE UP (ref 3.8–10.5)

## 2022-11-08 PROCEDURE — 85018 HEMOGLOBIN: CPT

## 2022-11-08 PROCEDURE — 85520 HEPARIN ASSAY: CPT

## 2022-11-08 PROCEDURE — 97116 GAIT TRAINING THERAPY: CPT

## 2022-11-08 PROCEDURE — 84295 ASSAY OF SERUM SODIUM: CPT

## 2022-11-08 PROCEDURE — P9045: CPT

## 2022-11-08 PROCEDURE — 83735 ASSAY OF MAGNESIUM: CPT

## 2022-11-08 PROCEDURE — 82553 CREATINE MB FRACTION: CPT

## 2022-11-08 PROCEDURE — 97161 PT EVAL LOW COMPLEX 20 MIN: CPT

## 2022-11-08 PROCEDURE — 82947 ASSAY GLUCOSE BLOOD QUANT: CPT

## 2022-11-08 PROCEDURE — 85014 HEMATOCRIT: CPT

## 2022-11-08 PROCEDURE — C1729: CPT

## 2022-11-08 PROCEDURE — C1751: CPT

## 2022-11-08 PROCEDURE — 97166 OT EVAL MOD COMPLEX 45 MIN: CPT

## 2022-11-08 PROCEDURE — 84484 ASSAY OF TROPONIN QUANT: CPT

## 2022-11-08 PROCEDURE — 82330 ASSAY OF CALCIUM: CPT

## 2022-11-08 PROCEDURE — 85730 THROMBOPLASTIN TIME PARTIAL: CPT

## 2022-11-08 PROCEDURE — P9047: CPT

## 2022-11-08 PROCEDURE — 71045 X-RAY EXAM CHEST 1 VIEW: CPT

## 2022-11-08 PROCEDURE — 84132 ASSAY OF SERUM POTASSIUM: CPT

## 2022-11-08 PROCEDURE — 82962 GLUCOSE BLOOD TEST: CPT

## 2022-11-08 PROCEDURE — 86923 COMPATIBILITY TEST ELECTRIC: CPT

## 2022-11-08 PROCEDURE — C1769: CPT

## 2022-11-08 PROCEDURE — 93005 ELECTROCARDIOGRAM TRACING: CPT

## 2022-11-08 PROCEDURE — 82803 BLOOD GASES ANY COMBINATION: CPT

## 2022-11-08 PROCEDURE — 85610 PROTHROMBIN TIME: CPT

## 2022-11-08 PROCEDURE — 86901 BLOOD TYPING SEROLOGIC RH(D): CPT

## 2022-11-08 PROCEDURE — 85396 CLOTTING ASSAY WHOLE BLOOD: CPT

## 2022-11-08 PROCEDURE — 83605 ASSAY OF LACTIC ACID: CPT

## 2022-11-08 PROCEDURE — 82565 ASSAY OF CREATININE: CPT

## 2022-11-08 PROCEDURE — 85027 COMPLETE CBC AUTOMATED: CPT

## 2022-11-08 PROCEDURE — 82435 ASSAY OF BLOOD CHLORIDE: CPT

## 2022-11-08 PROCEDURE — C1889: CPT

## 2022-11-08 PROCEDURE — 86891 AUTOLOGOUS BLOOD OP SALVAGE: CPT

## 2022-11-08 PROCEDURE — 71045 X-RAY EXAM CHEST 1 VIEW: CPT | Mod: 26

## 2022-11-08 PROCEDURE — 36415 COLL VENOUS BLD VENIPUNCTURE: CPT

## 2022-11-08 PROCEDURE — 85025 COMPLETE CBC W/AUTO DIFF WBC: CPT

## 2022-11-08 PROCEDURE — 86850 RBC ANTIBODY SCREEN: CPT

## 2022-11-08 PROCEDURE — 82550 ASSAY OF CK (CPK): CPT

## 2022-11-08 PROCEDURE — 94002 VENT MGMT INPAT INIT DAY: CPT

## 2022-11-08 PROCEDURE — 80053 COMPREHEN METABOLIC PANEL: CPT

## 2022-11-08 PROCEDURE — 97530 THERAPEUTIC ACTIVITIES: CPT

## 2022-11-08 PROCEDURE — 86900 BLOOD TYPING SEROLOGIC ABO: CPT

## 2022-11-08 PROCEDURE — 84100 ASSAY OF PHOSPHORUS: CPT

## 2022-11-08 PROCEDURE — 85384 FIBRINOGEN ACTIVITY: CPT

## 2022-11-08 RX ORDER — FUROSEMIDE 40 MG
1 TABLET ORAL
Qty: 30 | Refills: 0
Start: 2022-11-08 | End: 2022-12-07

## 2022-11-08 RX ORDER — ASPIRIN/CALCIUM CARB/MAGNESIUM 324 MG
1 TABLET ORAL
Qty: 30 | Refills: 0
Start: 2022-11-08 | End: 2022-12-07

## 2022-11-08 RX ORDER — SENNA PLUS 8.6 MG/1
2 TABLET ORAL
Qty: 0 | Refills: 0 | DISCHARGE
Start: 2022-11-08

## 2022-11-08 RX ORDER — SPIRONOLACTONE 25 MG/1
1 TABLET, FILM COATED ORAL
Qty: 30 | Refills: 0
Start: 2022-11-08 | End: 2022-12-07

## 2022-11-08 RX ORDER — METOPROLOL TARTRATE 50 MG
3 TABLET ORAL
Qty: 90 | Refills: 0
Start: 2022-11-08 | End: 2022-12-07

## 2022-11-08 RX ORDER — ACETAMINOPHEN 500 MG
1 TABLET ORAL
Qty: 0 | Refills: 0 | DISCHARGE
Start: 2022-11-08

## 2022-11-08 RX ORDER — POLYETHYLENE GLYCOL 3350 17 G/17G
17 POWDER, FOR SOLUTION ORAL ONCE
Refills: 0 | Status: DISCONTINUED | OUTPATIENT
Start: 2022-11-08 | End: 2022-11-08

## 2022-11-08 RX ORDER — SORBITOL SOLUTION 70 %
15 SOLUTION, ORAL MISCELLANEOUS ONCE
Refills: 0 | Status: COMPLETED | OUTPATIENT
Start: 2022-11-08 | End: 2022-11-08

## 2022-11-08 RX ORDER — OXYCODONE HYDROCHLORIDE 5 MG/1
1 TABLET ORAL
Qty: 28 | Refills: 0
Start: 2022-11-08 | End: 2022-11-14

## 2022-11-08 RX ADMIN — Medication 20 MILLIGRAM(S): at 05:22

## 2022-11-08 RX ADMIN — PANTOPRAZOLE SODIUM 40 MILLIGRAM(S): 20 TABLET, DELAYED RELEASE ORAL at 05:22

## 2022-11-08 RX ADMIN — GABAPENTIN 100 MILLIGRAM(S): 400 CAPSULE ORAL at 05:21

## 2022-11-08 RX ADMIN — Medication 500 MILLIGRAM(S): at 05:21

## 2022-11-08 RX ADMIN — Medication 75 MILLIGRAM(S): at 05:22

## 2022-11-08 RX ADMIN — SPIRONOLACTONE 25 MILLIGRAM(S): 25 TABLET, FILM COATED ORAL at 05:21

## 2022-11-08 RX ADMIN — Medication 15 MILLILITER(S): at 08:04

## 2022-11-08 NOTE — DISCHARGE NOTE PROVIDER - NSDCFUADDINST_GEN_ALL_CORE_FT
no driving until cleared by Dr. Alvarado  refer to cardiac surgery do and don't checklist  call Dr. Alvarado for fever > 101

## 2022-11-08 NOTE — DISCHARGE NOTE PROVIDER - NSDCCPCAREPLAN_GEN_ALL_CORE_FT
PRINCIPAL DISCHARGE DIAGNOSIS  Diagnosis: S/P CABG x 3  Assessment and Plan of Treatment: shower daily  weigh yourself daily  continue current prescriptions as ordered  increase activity as tolerated   no added salt; low fat; low cholesterol, low salt diet.   follow up with Cardiologist in 1-2 weeks. Call to schedule appointment.  follow up with cardiac surgeon, Dr. Alvarado on Nov 18th at 1:00 pm; Call to confirm appointment. [phone]

## 2022-11-08 NOTE — DISCHARGE NOTE PROVIDER - CARE PROVIDER_API CALL
Danny Alvarado)  Thoracic and Cardiac Surgery  [address] [place] [address]  Phone: [phone]  Fax: [phone]  Follow Up Time:

## 2022-11-08 NOTE — DISCHARGE NOTE PROVIDER - HOSPITAL COURSE
62 year old male with past medical history of former smoker, HTN, HLD, Low back pain, CAD s/p multiple TRICE (most recent 5/23/22, Had brachytherapy to mid RCA on 6/22/22 ), ROCHELLE on CPAP with complaints of chest pain radiating to bilateral shoulder, occasional shortness of breath and occasional dizziness . Pt. endorses episodes of nonradiating chest pain with SOB/MARY prior to last PCI that have resolved with stent placement but did not feel better after the brachytherapy . He reports that his chest pain is with activities and needs to take rest to resolve the pain. His last chest pain was last week pretty much every day with less intensity. His recent 10/28/22 Cardiac Catheterization revealed LVEF 55%, LT heart catheterization demonstrated severe in stent restenosis of the LAD and the diagonal branches. LAD 90% stenosis and previously placed stent is a drug-eluting stent and is partially occluded. First diagonal 90% stenosis and previously placed stent is a drug-eluting stent and is partially occluded. There is mild non obstructive disease of the LCx. The RCA has moderate in stent restenosis of the mid RCA which recently had brachytherapy. There is 60% stenosis. He is referred by Dr.Ralph Keys with initial evaluation and management for CAD.     11/3 CABG x3 L  11/4 off Cardene gtt, started on BB. Transfer to SDU with Med and L Pleural CT in pace   11/5 VSS; RIJ/Strong removed. Maintain chest tubes -LWS. Continue to monitor chest tube drainage. PCA d'cd   11/6 d/c chest tubes as drainage decreases  Increase ambulation, wean O2  11/7 VSS - rounds made w/ Dr. Alvarado - will d/c CT's after ambulation. - d/c pw's later - possible d/c home tomorrow  11/8 VSS; RSR 70-90; discharge pt home today as per Dr. Alvarado

## 2022-11-08 NOTE — DISCHARGE NOTE PROVIDER - NSDCPNSUBOBJ_GEN_ALL_CORE
VSS  tele: RSR 70-90  neuro: A & O-4- no focal deficits noted   midsternal incision cdi anshu- sternum stable  lungs clear; RR easy unlabored  + bs nt nd + bm; neg n/v/d  LE: neg calf tenderness, neg LE edema, ppp bilaterally left SVG site cdi anshu ppp bilaterally     Discharge pt home today 11/8 as per Dr. Alvarado

## 2022-11-08 NOTE — DISCHARGE NOTE NURSING/CASE MANAGEMENT/SOCIAL WORK - NSDCPEFALRISK_GEN_ALL_CORE
For information on Fall & Injury Prevention, visit: [email or web address] OR  [email or web address] OR  https://www.cdc.gov/steadi/patient.html

## 2022-11-08 NOTE — DISCHARGE NOTE NURSING/CASE MANAGEMENT/SOCIAL WORK - PATIENT PORTAL LINK FT
You can access the FollowMyHealth Patient Portal offered by [facility] by registering at the following website: [email or web address]. By joining [employer]’s FollowMyHealth portal, you will also be able to view your health information using other applications (apps) compatible with our system.

## 2022-11-08 NOTE — DISCHARGE NOTE NURSING/CASE MANAGEMENT/SOCIAL WORK - NSSCNAMETXT_GEN_ALL_CORE
[facility], [phone].  Visiting nurse to call & arrange home care appointment for day after hospital discharge.

## 2022-11-08 NOTE — DISCHARGE NOTE PROVIDER - NSDCMRMEDTOKEN_GEN_ALL_CORE_FT
acetaminophen: 1 tab(s) orally every 6 hours as needed for mild pain  aspirin 81 mg oral delayed release tablet: 1 tab(s) orally once a day  furosemide 20 mg oral tablet: 1 tab(s) orally once a day  metoprolol succinate 25 mg oral tablet, extended release: 3 tab(s) (total 75 mg) orally once a day  Nexletol 180 mg oral tablet: 1 tab(s) orally once a day  oxyCODONE 5 mg oral tablet: 1 tab(s) orally every 6 hours, As Needed -Moderate Pain (4 - 6) MDD:4  pantoprazole 40 mg oral delayed release tablet: 1 tab(s) orally once a day  rosuvastatin 40 mg oral tablet: 1 tab(s) orally once a day  senna leaf extract oral tablet: 2 tab(s) orally once a day (at bedtime)  spironolactone 25 mg oral tablet: 1 tab(s) orally once a day  Vascepa 1 g oral capsule: 2 cap(s) orally 2 times a day

## 2022-11-09 ENCOUNTER — TRANSCRIPTION ENCOUNTER (OUTPATIENT)
Age: 62
End: 2022-11-09

## 2022-11-10 ENCOUNTER — APPOINTMENT (OUTPATIENT)
Dept: CARE COORDINATION | Facility: HOME HEALTH | Age: 62
End: 2022-11-10

## 2022-11-10 VITALS
OXYGEN SATURATION: 98 % | SYSTOLIC BLOOD PRESSURE: 108 MMHG | DIASTOLIC BLOOD PRESSURE: 68 MMHG | HEART RATE: 77 BPM | RESPIRATION RATE: 16 BRPM | BODY MASS INDEX: 27.02 KG/M2 | WEIGHT: 183 LBS

## 2022-11-10 PROCEDURE — 99024 POSTOP FOLLOW-UP VISIT: CPT

## 2022-11-10 RX ORDER — CLOPIDOGREL 75 MG/1
75 TABLET, FILM COATED ORAL DAILY
Refills: 0 | Status: DISCONTINUED | COMMUNITY
End: 2022-11-10

## 2022-11-10 RX ORDER — NEBIVOLOL HYDROCHLORIDE 2.5 MG/1
2.5 TABLET ORAL DAILY
Refills: 0 | Status: DISCONTINUED | COMMUNITY
End: 2022-11-10

## 2022-11-10 NOTE — CHART NOTE - NSCHARTNOTEFT_GEN_A_CORE
This patient underwent CABGx3 on 11/3/22 and required post-operative IVF resuscitation secondary to hypovolemic shock.

## 2022-11-10 NOTE — HISTORY OF PRESENT ILLNESS
[FreeTextEntry1] : 62 year old male with past medical history of former smoker, HTN, HLD, Low back\par pain, CAD s/p multiple TRICE (most recent 5/23/22, Had brachytherapy to mid RCA\par on 6/22/22 ), ROCHELLE on CPAP with complaints of chest pain radiating to bilateral\par shoulder, occasional shortness of breath and occasional dizziness . Pt.\par endorses episodes of nonradiating chest pain with SOB/MARY prior to last PCI\par that have resolved with stent placement but did not feel better after the\par brachytherapy . He reports that his chest pain is with activities and needs to\par take rest to resolve the pain. His last chest pain was last week pretty much\par every day with less intensity. His recent 10/28/22 Cardiac Catheterization\par revealed LVEF 55%, LT heart catheterization demonstrated severe in stent\par restenosis of the LAD and the diagonal branches. LAD 90% stenosis and\par previously placed stent is a drug-eluting stent and is partially occluded.\par First diagonal 90% stenosis and previously placed stent is a drug-eluting stent\par and is partially occluded. There is mild non obstructive disease of the LCx.\par The RCA has moderate in stent restenosis of the mid RCA which recently had\par brachytherapy. There is 60% stenosis. He is referred by Dr.Ralph Keys with\par initial evaluation and management for CAD.\par  \par 11/3 CABG x3 Dr Alvarado \par \par recovering well at home with wife\par education and emotional support provided\par all questions answered

## 2022-11-14 ENCOUNTER — TRANSCRIPTION ENCOUNTER (OUTPATIENT)
Age: 62
End: 2022-11-14

## 2022-11-15 ENCOUNTER — TRANSCRIPTION ENCOUNTER (OUTPATIENT)
Age: 62
End: 2022-11-15

## 2022-11-16 PROBLEM — Z09 POSTOPERATIVE FOLLOW-UP: Status: ACTIVE | Noted: 2022-11-16

## 2022-11-16 RX ORDER — METOPROLOL SUCCINATE 25 MG/1
25 TABLET, EXTENDED RELEASE ORAL
Qty: 90 | Refills: 0 | Status: ACTIVE | COMMUNITY
Start: 2022-11-08

## 2022-11-18 ENCOUNTER — APPOINTMENT (OUTPATIENT)
Dept: CARDIOTHORACIC SURGERY | Facility: CLINIC | Age: 62
End: 2022-11-18

## 2022-11-18 VITALS
WEIGHT: 181 LBS | SYSTOLIC BLOOD PRESSURE: 144 MMHG | OXYGEN SATURATION: 97 % | BODY MASS INDEX: 26.81 KG/M2 | HEART RATE: 84 BPM | TEMPERATURE: 97.4 F | HEIGHT: 69 IN | RESPIRATION RATE: 16 BRPM | DIASTOLIC BLOOD PRESSURE: 92 MMHG

## 2022-11-18 DIAGNOSIS — Z09 ENCOUNTER FOR FOLLOW-UP EXAMINATION AFTER COMPLETED TREATMENT FOR CONDITIONS OTHER THAN MALIGNANT NEOPLASM: ICD-10-CM

## 2022-11-18 PROCEDURE — 99024 POSTOP FOLLOW-UP VISIT: CPT

## 2022-11-18 RX ORDER — PANTOPRAZOLE 40 MG/1
40 TABLET, DELAYED RELEASE ORAL DAILY
Qty: 30 | Refills: 0 | Status: COMPLETED | COMMUNITY
End: 2022-11-18

## 2022-11-18 RX ORDER — SPIRONOLACTONE 25 MG/1
25 TABLET ORAL DAILY
Qty: 7 | Refills: 0 | Status: COMPLETED | COMMUNITY
Start: 2022-11-08 | End: 2022-11-18

## 2022-11-18 RX ORDER — PANTOPRAZOLE 40 MG/1
40 TABLET, DELAYED RELEASE ORAL DAILY
Qty: 30 | Refills: 0 | Status: ACTIVE | COMMUNITY
Start: 2022-11-18

## 2022-11-18 RX ORDER — FUROSEMIDE 20 MG/1
20 TABLET ORAL DAILY
Qty: 7 | Refills: 0 | Status: COMPLETED | COMMUNITY
Start: 2022-11-08 | End: 2022-11-18

## 2022-11-21 NOTE — ASSESSMENT
[FreeTextEntry1] : Mr. SARAVIA is a 62 year old male with  past medical history of former smoker, HTN, HLD, Low back \par pain, CAD s/p multiple TRICE (most recent 5/23/22, Had brachytherapy to mid RCA on 6/22/22 ), ROCHELLE on CPAP with complaints of chest pain radiating to bilateral shoulder, occasional shortness of breath and occasional dizziness . Pt. endorses episodes of nonradiating chest pain with SOB/MARY prior to last PCI that have resolved with stent placement but did not feel better after the brachytherapy . He reports that his chest pain is with activities and needs to take rest to resolve the pain. His last chest pain was last week pretty much every day with less intensity. His recent 10/28/22 Cardiac Catheterization revealed LVEF 55%, LT heart catheterization demonstrated severe in stent restenosis of the LAD and the diagonal branches. LAD 90% stenosis and previously placed stent is a drug-eluting stent and is partially occluded. First diagonal 90% stenosis and previously placed stent is a drug-eluting stent and is partially occluded. There is mild non obstructive disease of the LCx. The RCA has moderate in stent restenosis of the mid RCA which recently had brachytherapy. There is 60% stenosis. He is referred by Dr.Ralph Keys with initial evaluation and management for CAD. \par \par He is S/P CABG X 3 (LIMA to LAD, SVG to RAMUS, RPDA) on 11/3/22. Post op course unremarkable. He is here for post op visit. \par \par \par Today he presents and reports occasional dizziness and  palpitations. He had diarrhoea  Denies any chest pain, shortness of breath, syncope or PND. \par \par Today on exam patient's lungs clear bilaterally, normal sinus rhythm, sternum stable, incision clean, dry and intact. LT SVG site is clean, dry and intact.  No peripheral edema noted. Sutures removed today. Instructed patient on importance of optimal glycemic control, daily showering, daily weights, incentive spirometer use, and increase ambulation as tolerated. Instructed to call office with any signs or symptoms of infection or weight gain of 2 or more pounds in 1 day or 3 or more pounds in 1 week.  \par \par Plan:\par 1) Continue current medication regimen\par 2) Follow up with cardiologist ( Dr.Ralph Keys on 11/30/22 ) and PCP \par 3) May return on as needed basis \par 4) Ambulate as tolerated \par 5) Discontinue diuretics\par 6) Continue to increase activity and walk daily as tolerated. Continue to use incentive spirometer. \par 7) Keep legs elevated above heart when resting/sitting/sleeping. \par 8) Call MD if you experience fever, fatigue, dizziness, confusion, syncope, shortness of breath, chest pain not relieved with analgesics, increased redness/drainage from the surgical  incision site\par 9) Permitted to drive \par 10) Follow up in 1 month \par 11) Virtual cardiac rehab referral\par 12) Nutritionist referral \par \par \par

## 2022-11-21 NOTE — REASON FOR VISIT
[Family Member] : family member [de-identified] : CABG X 3 (LIMA to LAD, SVG to RAMUS, RPDA) [de-identified] : 11/3/22

## 2022-11-21 NOTE — CONSULT LETTER
[Dear  ___] : Dear  [unfilled], [FreeTextEntry2] : Dr.Ralph Keys  [FreeTextEntry1] : I had the pleasure of seeing your patient, RENETTA SARAVIA, in my office today. \par \par We take a multidisciplinary team approach to patient care and consider you, the referring physician, an extension of our team. We will maintain an open line of communication with you throughout your patient's treatment course.  \par \par As you recall, he is a 62 year old male status post CABG X 3 (LIMA to LAD, SVG to RAMUS, RPDA) on 11/3/22.The patient presents to the office today for a routine postop visit . I have enclosed a copy for your records.\par \par Today on exam patient's lungs clear bilaterally, normal sinus rhythm, sternum stable, incision clean, dry and intact. LT SVG site is clean, dry and intact.  No peripheral edema noted . Follow up in 1 month.  I have recommended that the patient to follow up with Cardiologist and PCP. \par \par I appreciate the opportunity to care for your patient at [facility] . If there are any questions or concerns, please call me at [phone]. \par \par Please see my note below. \par \par Sincerely, \par \par \par \par \par \par Danny Alvarado MD\par Director\par [facility]\par Professor \par Cardiovascular & Thoracic Surgery\par [facility] [employer].\par \par \par [facility]:\par Department of Cardiovascular and Thoracic Surgery\[address], [address]\par Office: [phone]\par Fax: [phone]\par \par Shannen Schwartz\par  \[facility] Department of Cardiovascular and Thoracic Surgery\[address], [address]\par Phone: [phone]\par Office: [phone]\par

## 2022-11-21 NOTE — PHYSICAL EXAM
[] : no respiratory distress [Respiration, Rhythm And Depth] : normal respiratory rhythm and effort [Auscultation Breath Sounds / Voice Sounds] : lungs were clear to auscultation bilaterally [Apical Impulse] : the apical impulse was normal [Heart Rate And Rhythm] : heart rate was normal and rhythm regular [Heart Sounds] : normal S1 and S2 [Murmurs] : no murmurs [Clean] : clean [Dry] : dry [Healing Well] : healing well [No Edema] : no edema [FreeTextEntry5] : LT G site

## 2022-11-21 NOTE — END OF VISIT
[FreeTextEntry3] : \par I personally scribed for AMINTA SWEET on Nov 18 2022  1:00PM . \par \par \par \par \par Physician Attestation:\par Documented by ALISON MONTES acting as a scribe for AMINTA SWEET 11/18/2022 . \par                 All medical record entries made by the Scribe were at my, AMINTA SWEET , direction and personally dictated by me on 11/18/2022 . I have reviewed the chart and agree that the record accurately reflects my personal performance of the history, physical exam, assessment and plan\par \par

## 2022-11-21 NOTE — DISCUSSION/SUMMARY
[Doing Well] : is doing well [Excellent Pain Control] : has excellent pain control [No Sign of Infection] : is showing no signs of infection [0] : 0 [Remove Sutures/Staples] : removed sutures/staples [Coumadin] : the patient is not on Coumadin

## 2022-12-08 ENCOUNTER — TRANSCRIPTION ENCOUNTER (OUTPATIENT)
Age: 62
End: 2022-12-08

## 2022-12-09 ENCOUNTER — APPOINTMENT (OUTPATIENT)
Dept: CARDIOTHORACIC SURGERY | Facility: CLINIC | Age: 62
End: 2022-12-09

## 2022-12-09 PROCEDURE — 97802 MEDICAL NUTRITION INDIV IN: CPT

## 2022-12-13 ENCOUNTER — APPOINTMENT (OUTPATIENT)
Dept: CARDIOLOGY | Facility: CLINIC | Age: 62
End: 2022-12-13

## 2022-12-16 ENCOUNTER — APPOINTMENT (OUTPATIENT)
Dept: CARDIOTHORACIC SURGERY | Facility: CLINIC | Age: 62
End: 2022-12-16

## 2022-12-21 ENCOUNTER — APPOINTMENT (OUTPATIENT)
Dept: CARDIOTHORACIC SURGERY | Facility: CLINIC | Age: 62
End: 2022-12-21

## 2022-12-21 VITALS
SYSTOLIC BLOOD PRESSURE: 140 MMHG | TEMPERATURE: 98 F | WEIGHT: 190 LBS | BODY MASS INDEX: 28.14 KG/M2 | HEIGHT: 69 IN | DIASTOLIC BLOOD PRESSURE: 85 MMHG | HEART RATE: 80 BPM | OXYGEN SATURATION: 98 % | RESPIRATION RATE: 14 BRPM

## 2022-12-21 DIAGNOSIS — Z95.1 PRESENCE OF AORTOCORONARY BYPASS GRAFT: ICD-10-CM

## 2022-12-21 PROCEDURE — 99024 POSTOP FOLLOW-UP VISIT: CPT

## 2022-12-27 NOTE — OCCUPATIONAL THERAPY INITIAL EVALUATION ADULT - HEALTH SCREEN CRITERIA
Writer called patient in regards to new patient appointment. Nurse spoke with patient regarding appointment on 1/3/2023.      Patient seeing Dr. Chowdhury. Patient consult from Kristina Lai MD (derm) for allergic contact dermatitis  was reviewed as well as appointment time and location.     Advised to stop antihistamines such as Allegra, Claritin, Zyrtec, or Xyzal, and Astelin or Patanase 5 days prior to appointment. Also advised to stop Trazodone, Benadryl, ranitidine or famotidine 72 hours prior to appointment; should physician determine allergy testing is appropriate at visit. Instructed to continue all other medications as prescribed, including inhalers.    Patient was asked to arrive for appointment 15 minutes early with completed paperwork that they should have received, along with any pertinent medical/allergy records.     Patient verbalized understanding, agrees with plan at this time, and denied any further questions for staff at this time.        REMINDERS: if patient is on a beta blocker or has active hives skin testing would likely not be done at first appointment and therefore patient should be asked to continue all medications and disregard medication instruction sheet in packet.    If patient is being seen for skin issue please make sure consult is for contact dermatitis, urticaria, or childhood atopic dermatitis. IF NOT one of these, please talk with patient about appropriatness of dermatology evaluation prior to allergy. Then, after biopsy/diagnosis from dermatology, we can see them if appropriate.      yes

## 2022-12-28 NOTE — CONSULT LETTER
[FreeTextEntry2] : Abilio Keys M.D.\par [address]\[place] [address]\par [phone]\par Fax:  [phone] [FreeTextEntry3] : Danny Alvarado MD\par Director\par [facility]\par Professor \par Cardiovascular & Thoracic Surgery\par [facility].

## 2022-12-28 NOTE — ASSESSMENT
[FreeTextEntry1] : Today on exam patient's lungs clear bilaterally, normal sinus rhythm, sternum stable, incision clean, dry and intact. Left LE SVG site is clean, dry and intact. No peripheral edema noted. Instructed patient on importance of optimal glycemic control, daily showering, daily weights, incentive spirometer use, and increase ambulation as tolerated. Instructed to call office with any signs or symptoms of infection or weight gain of 2 or more pounds in 1 day or 3 or more pounds in 1 week. \par \par Plan:\par \par - Continue current medication regimen\par - Follow up with cardiologist\par - May return on as needed basis \par - Continue to walk and increase as tolerated\par - Low salt diet and fluids discussed in detail\par - Call with any questions or concerns\par \par

## 2022-12-28 NOTE — PHYSICAL EXAM
[] : no respiratory distress [Respiration, Rhythm And Depth] : normal respiratory rhythm and effort [Exaggerated Use Of Accessory Muscles For Inspiration] : no accessory muscle use [Auscultation Breath Sounds / Voice Sounds] : lungs were clear to auscultation bilaterally [Apical Impulse] : the apical impulse was normal [Heart Rate And Rhythm] : heart rate was normal and rhythm regular [Heart Sounds] : normal S1 and S2 [Murmurs] : no murmurs [No Edema] : no edema [Clean] : clean [Dry] : dry [Healing Well] : healing well [Bleeding] : no active bleeding [Foul Odor] : no foul smell [Purulent Drainage] : no purulent drainage [Serosanguinous Drainage] : no serosanguinous drainage [Erythema] : not erythematous [Warm] : not warm [Tender] : not tender [FreeTextEntry5] : Left LE SVG site

## 2022-12-28 NOTE — END OF VISIT
[FreeTextEntry3] : I, Dr. Danny Alvarado, personally performed the evaluation and management (E/M) services for this established patient who presents today with (a) new problem(s)/exacerbation of (an) existing condition(s).  That E/M includes conducting the examination, assessing all new/exacerbated conditions, and establishing a new plan of care.  Today, the ACP, Ruben Whatley, was here to observe my evaluation and management services for this new problem/exacerbated condition to be followed going forward.

## 2022-12-28 NOTE — REASON FOR VISIT
[de-identified] : CABG X 3 (LIMA to LAD, SVG to RAMUS, RPDA) [de-identified] : 11/3/2022 [de-identified] : Mr. SARAVIA is a 62 year old male with past medical history of former smoker, HTN, HLD, Low back \par pain, CAD s/p multiple TRICE (most recent 5/23/22, Had brachytherapy to mid RCA on 6/22/22 ), ROCHELLE on CPAP with complaints of chest pain radiating to bilateral shoulder, occasional shortness of breath and occasional dizziness. Pt. endorses episodes of nonradiating chest pain with SOB/MARY prior to last PCI that have resolved with stent placement but did not feel better after the brachytherapy. He reports that his chest pain is with activities and needs to take rest to resolve the pain. He is referred by Dr.Ralph Keys with initial evaluation and management for CAD. \par \par He is S/P CABG X 3 (LIMA to LAD, SVG to RAMUS, RPDA) on 11/3/22. Post op course unremarkable, seen post op 11/18/2022 and was progressing well.  Follows Dr. Keys for cardiology. \par \par Presents today and reports feeling well.  Gradually progressing since DC and walking more everyday. Eating and drinking with +BM. Denies chest pain, SOB, swelling, weight gain, palpitations, cough, fever or chills. Followed up with Dr. Keys for cardiology and has another apt with him 12/27 with TTE.\par

## 2023-04-27 ENCOUNTER — OFFICE (OUTPATIENT)
Dept: URBAN - METROPOLITAN AREA CLINIC 104 | Facility: CLINIC | Age: 63
Setting detail: OPHTHALMOLOGY
End: 2023-04-27
Payer: COMMERCIAL

## 2023-04-27 DIAGNOSIS — H01.002: ICD-10-CM

## 2023-04-27 DIAGNOSIS — H01.004: ICD-10-CM

## 2023-04-27 DIAGNOSIS — H01.001: ICD-10-CM

## 2023-04-27 DIAGNOSIS — H01.005: ICD-10-CM

## 2023-04-27 DIAGNOSIS — H25.13: ICD-10-CM

## 2023-04-27 PROCEDURE — 92014 COMPRE OPH EXAM EST PT 1/>: CPT | Performed by: OPTOMETRIST

## 2023-04-27 ASSESSMENT — REFRACTION_CURRENTRX
OD_SPHERE: PL
OS_OVR_VA: 20/
OD_AXIS: 101
OD_CYLINDER: -0.50
OS_AXIS: 075
OS_CYLINDER: -1.00
OD_OVR_VA: 20/
OD_ADD: +2.00
OS_ADD: +2.00
OS_SPHERE: -0.50

## 2023-04-27 ASSESSMENT — REFRACTION_AUTOREFRACTION
OS_SPHERE: -0.50
OS_AXIS: 064
OS_CYLINDER: -0.75
OD_AXIS: 137
OD_SPHERE: PL
OD_CYLINDER: -0.25

## 2023-04-27 ASSESSMENT — KERATOMETRY
OD_K2POWER_DIOPTERS: 45.00
OD_K1POWER_DIOPTERS: 44.53
OD_AXISANGLE_DEGREES: 015
OS_K2POWER_DIOPTERS: 44.94
OS_K1POWER_DIOPTERS: 43.83
OS_AXISANGLE_DEGREES: 158

## 2023-04-27 ASSESSMENT — AXIALLENGTH_DERIVED: OS_AL: 23.61

## 2023-04-27 ASSESSMENT — VISUAL ACUITY
OS_BCVA: 20/20-1
OD_BCVA: 20/25-2

## 2023-04-27 ASSESSMENT — LID EXAM ASSESSMENTS
OD_BLEPHARITIS: RLL RUL 1+ 2+
OS_BLEPHARITIS: LLL LUL 1+ 2+

## 2023-04-27 ASSESSMENT — SPHEQUIV_DERIVED: OS_SPHEQUIV: -0.875

## 2023-04-27 ASSESSMENT — TONOMETRY
OD_IOP_MMHG: 20
OS_IOP_MMHG: 20

## 2023-08-14 ENCOUNTER — NON-APPOINTMENT (OUTPATIENT)
Age: 63
End: 2023-08-14

## 2023-08-15 ENCOUNTER — EMERGENCY (EMERGENCY)
Facility: HOSPITAL | Age: 63
LOS: 1 days | Discharge: HOME | End: 2023-08-15
Attending: STUDENT IN AN ORGANIZED HEALTH CARE EDUCATION/TRAINING PROGRAM | Admitting: STUDENT IN AN ORGANIZED HEALTH CARE EDUCATION/TRAINING PROGRAM
Payer: COMMERCIAL

## 2023-08-15 VITALS
DIASTOLIC BLOOD PRESSURE: 70 MMHG | TEMPERATURE: 98 F | RESPIRATION RATE: 18 BRPM | HEART RATE: 80 BPM | SYSTOLIC BLOOD PRESSURE: 120 MMHG | OXYGEN SATURATION: 97 %

## 2023-08-15 VITALS
HEART RATE: 75 BPM | WEIGHT: 190.92 LBS | DIASTOLIC BLOOD PRESSURE: 79 MMHG | OXYGEN SATURATION: 94 % | TEMPERATURE: 98 F | SYSTOLIC BLOOD PRESSURE: 126 MMHG | RESPIRATION RATE: 16 BRPM | HEIGHT: 69 IN

## 2023-08-15 DIAGNOSIS — Z95.5 PRESENCE OF CORONARY ANGIOPLASTY IMPLANT AND GRAFT: Chronic | ICD-10-CM

## 2023-08-15 LAB
ALBUMIN SERPL ELPH-MCNC: 3.6 G/DL — SIGNIFICANT CHANGE UP (ref 3.3–5)
ALP SERPL-CCNC: 43 U/L — SIGNIFICANT CHANGE UP (ref 40–120)
ALT FLD-CCNC: 20 U/L — SIGNIFICANT CHANGE UP (ref 12–78)
ANION GAP SERPL CALC-SCNC: 8 MMOL/L — SIGNIFICANT CHANGE UP (ref 5–17)
APPEARANCE UR: ABNORMAL
AST SERPL-CCNC: 15 U/L — SIGNIFICANT CHANGE UP (ref 15–37)
BASOPHILS # BLD AUTO: 0.04 K/UL — SIGNIFICANT CHANGE UP (ref 0–0.2)
BASOPHILS NFR BLD AUTO: 0.3 % — SIGNIFICANT CHANGE UP (ref 0–2)
BILIRUB SERPL-MCNC: 1.3 MG/DL — HIGH (ref 0.2–1.2)
BILIRUB UR-MCNC: ABNORMAL
BUN SERPL-MCNC: 17 MG/DL — SIGNIFICANT CHANGE UP (ref 7–23)
CALCIUM SERPL-MCNC: 9.4 MG/DL — SIGNIFICANT CHANGE UP (ref 8.5–10.1)
CHLORIDE SERPL-SCNC: 106 MMOL/L — SIGNIFICANT CHANGE UP (ref 96–108)
CO2 SERPL-SCNC: 27 MMOL/L — SIGNIFICANT CHANGE UP (ref 22–31)
COLOR SPEC: ABNORMAL
CREAT SERPL-MCNC: 1.2 MG/DL — SIGNIFICANT CHANGE UP (ref 0.5–1.3)
DIFF PNL FLD: ABNORMAL
EGFR: 68 ML/MIN/1.73M2 — SIGNIFICANT CHANGE UP
EOSINOPHIL # BLD AUTO: 0.11 K/UL — SIGNIFICANT CHANGE UP (ref 0–0.5)
EOSINOPHIL NFR BLD AUTO: 0.9 % — SIGNIFICANT CHANGE UP (ref 0–6)
GLUCOSE SERPL-MCNC: 129 MG/DL — HIGH (ref 70–99)
GLUCOSE UR QL: NEGATIVE MG/DL — SIGNIFICANT CHANGE UP
HCT VFR BLD CALC: 39.9 % — SIGNIFICANT CHANGE UP (ref 39–50)
HGB BLD-MCNC: 13.3 G/DL — SIGNIFICANT CHANGE UP (ref 13–17)
IMM GRANULOCYTES NFR BLD AUTO: 0.3 % — SIGNIFICANT CHANGE UP (ref 0–0.9)
KETONES UR-MCNC: NEGATIVE MG/DL — SIGNIFICANT CHANGE UP
LACTATE SERPL-SCNC: 1.1 MMOL/L — SIGNIFICANT CHANGE UP (ref 0.7–2)
LEUKOCYTE ESTERASE UR-ACNC: ABNORMAL
LYMPHOCYTES # BLD AUTO: 0.79 K/UL — LOW (ref 1–3.3)
LYMPHOCYTES # BLD AUTO: 6.6 % — LOW (ref 13–44)
MCHC RBC-ENTMCNC: 30.9 PG — SIGNIFICANT CHANGE UP (ref 27–34)
MCHC RBC-ENTMCNC: 33.3 GM/DL — SIGNIFICANT CHANGE UP (ref 32–36)
MCV RBC AUTO: 92.8 FL — SIGNIFICANT CHANGE UP (ref 80–100)
MONOCYTES # BLD AUTO: 0.75 K/UL — SIGNIFICANT CHANGE UP (ref 0–0.9)
MONOCYTES NFR BLD AUTO: 6.3 % — SIGNIFICANT CHANGE UP (ref 2–14)
NEUTROPHILS # BLD AUTO: 10.19 K/UL — HIGH (ref 1.8–7.4)
NEUTROPHILS NFR BLD AUTO: 85.6 % — HIGH (ref 43–77)
NITRITE UR-MCNC: POSITIVE
NRBC # BLD: 0 /100 WBCS — SIGNIFICANT CHANGE UP (ref 0–0)
PH UR: 5 — SIGNIFICANT CHANGE UP (ref 5–8)
PLATELET # BLD AUTO: 204 K/UL — SIGNIFICANT CHANGE UP (ref 150–400)
POTASSIUM SERPL-MCNC: 4 MMOL/L — SIGNIFICANT CHANGE UP (ref 3.5–5.3)
POTASSIUM SERPL-SCNC: 4 MMOL/L — SIGNIFICANT CHANGE UP (ref 3.5–5.3)
PROT SERPL-MCNC: 7.3 G/DL — SIGNIFICANT CHANGE UP (ref 6–8.3)
PROT UR-MCNC: 100 MG/DL
RBC # BLD: 4.3 M/UL — SIGNIFICANT CHANGE UP (ref 4.2–5.8)
RBC # FLD: 12.5 % — SIGNIFICANT CHANGE UP (ref 10.3–14.5)
SODIUM SERPL-SCNC: 141 MMOL/L — SIGNIFICANT CHANGE UP (ref 135–145)
SP GR SPEC: 1.04 — HIGH (ref 1–1.03)
UROBILINOGEN FLD QL: 1 MG/DL — SIGNIFICANT CHANGE UP (ref 0.2–1)
WBC # BLD: 11.92 K/UL — HIGH (ref 3.8–10.5)
WBC # FLD AUTO: 11.92 K/UL — HIGH (ref 3.8–10.5)

## 2023-08-15 PROCEDURE — 87086 URINE CULTURE/COLONY COUNT: CPT

## 2023-08-15 PROCEDURE — 87186 SC STD MICRODIL/AGAR DIL: CPT

## 2023-08-15 PROCEDURE — 99284 EMERGENCY DEPT VISIT MOD MDM: CPT

## 2023-08-15 PROCEDURE — 74176 CT ABD & PELVIS W/O CONTRAST: CPT | Mod: MA

## 2023-08-15 PROCEDURE — 80053 COMPREHEN METABOLIC PANEL: CPT

## 2023-08-15 PROCEDURE — 93010 ELECTROCARDIOGRAM REPORT: CPT

## 2023-08-15 PROCEDURE — 96374 THER/PROPH/DIAG INJ IV PUSH: CPT

## 2023-08-15 PROCEDURE — 99285 EMERGENCY DEPT VISIT HI MDM: CPT | Mod: 25

## 2023-08-15 PROCEDURE — 93005 ELECTROCARDIOGRAM TRACING: CPT

## 2023-08-15 PROCEDURE — 81001 URINALYSIS AUTO W/SCOPE: CPT

## 2023-08-15 PROCEDURE — 87040 BLOOD CULTURE FOR BACTERIA: CPT

## 2023-08-15 PROCEDURE — 83605 ASSAY OF LACTIC ACID: CPT

## 2023-08-15 PROCEDURE — 36415 COLL VENOUS BLD VENIPUNCTURE: CPT

## 2023-08-15 PROCEDURE — 85025 COMPLETE CBC W/AUTO DIFF WBC: CPT

## 2023-08-15 PROCEDURE — 74176 CT ABD & PELVIS W/O CONTRAST: CPT | Mod: 26,MA

## 2023-08-15 RX ORDER — CEFTRIAXONE 500 MG/1
1000 INJECTION, POWDER, FOR SOLUTION INTRAMUSCULAR; INTRAVENOUS ONCE
Refills: 0 | Status: COMPLETED | OUTPATIENT
Start: 2023-08-15 | End: 2023-08-15

## 2023-08-15 RX ORDER — SODIUM CHLORIDE 9 MG/ML
1000 INJECTION INTRAMUSCULAR; INTRAVENOUS; SUBCUTANEOUS ONCE
Refills: 0 | Status: COMPLETED | OUTPATIENT
Start: 2023-08-15 | End: 2023-08-15

## 2023-08-15 RX ORDER — CEFPODOXIME PROXETIL 100 MG
1 TABLET ORAL
Qty: 20 | Refills: 0
Start: 2023-08-15 | End: 2023-08-24

## 2023-08-15 RX ADMIN — SODIUM CHLORIDE 1000 MILLILITER(S): 9 INJECTION INTRAMUSCULAR; INTRAVENOUS; SUBCUTANEOUS at 13:24

## 2023-08-15 RX ADMIN — CEFTRIAXONE 100 MILLIGRAM(S): 500 INJECTION, POWDER, FOR SOLUTION INTRAMUSCULAR; INTRAVENOUS at 13:25

## 2023-08-15 NOTE — ED PROVIDER NOTE - OBJECTIVE STATEMENT
61 yo M PMHx HTN, HLD, CAD presents to ED c/o dysuria x 2 days, fever T max 102 F x last night. Pt states he took tylenol pta. Pt denies abd pain, flank pain, N/V, hematuria.

## 2023-08-15 NOTE — ED ADULT TRIAGE NOTE - CHIEF COMPLAINT QUOTE
Patient is a 63yo male sent from urgent care for a UTI with fever Patient states that he had a 102 temperature at home and took Tylenol at 0930 Patient states he just drive 20 hours from [place]

## 2023-08-15 NOTE — ED ADULT NURSE NOTE - OBJECTIVE STATEMENT
Patient comes in from urgent care for a UTI with fever. Patient states that he had a 102 temperature at home and took Tylenol at 0930. Patient states he just drive 20 hours from [place].

## 2023-08-15 NOTE — ED PROVIDER NOTE - ATTENDING APP SHARED VISIT CONTRIBUTION OF CARE
This was a shared visit with MALIK. I reviewed and verified the documentation and independently performed the documented MDM.

## 2023-08-15 NOTE — ED ADULT NURSE NOTE - CHIEF COMPLAINT QUOTE
Patient is a 61yo male sent from urgent care for a UTI with fever Patient states that he had a 102 temperature at home and took Tylenol at 0930 Patient states he just drive 20 hours from [place]

## 2023-08-15 NOTE — ED PROVIDER NOTE - PATIENT PORTAL LINK FT
You can access the FollowMyHealth Patient Portal offered by [facility] by registering at the following website: [email or web address]. By joining [facility]’s FollowMyHealth portal, you will also be able to view your health information using other applications (apps) compatible with our system.

## 2023-08-15 NOTE — ED PROVIDER NOTE - NSFOLLOWUPINSTRUCTIONS_ED_ALL_ED_FT
Start cefpodoxime 200 mg twice daily for 10 days  Follow up with urology in 1-2 days.  Return to the ED immediately for new or worsening symptoms as we discussed.    Urinary Tract Infection, Adult    A urinary tract infection (UTI) is an infection of any part of the urinary tract. The urinary tract includes the kidneys, ureters, bladder, and urethra. These organs make, store, and get rid of urine in the body.    An upper UTI affects the ureters and kidneys. A lower UTI affects the bladder and urethra.    What are the causes?  Most urinary tract infections are caused by bacteria in your genital area around your urethra, where urine leaves your body. These bacteria grow and cause inflammation of your urinary tract.    What increases the risk?  You are more likely to develop this condition if:  You have a urinary catheter that stays in place.  You are not able to control when you urinate or have a bowel movement (incontinence).  You are female and you:  Use a spermicide or diaphragm for birth control.  Have low estrogen levels.  Are pregnant.  You have certain genes that increase your risk.  You are sexually active.  You take antibiotic medicines.  You have a condition that causes your flow of urine to slow down, such as:  An enlarged prostate, if you are male.  Blockage in your urethra.  A kidney stone.  A nerve condition that affects your bladder control (neurogenic bladder).  Not getting enough to drink, or not urinating often.  You have certain medical conditions, such as:  Diabetes.  A weak disease-fighting system (immunesystem).  Sickle cell disease.  Gout.  Spinal cord injury.  What are the signs or symptoms?  Symptoms of this condition include:  Needing to urinate right away (urgency).  Frequent urination. This may include small amounts of urine each time you urinate.  Pain or burning with urination.  Blood in the urine.  Urine that smells bad or unusual.  Trouble urinating.  Cloudy urine.  Vaginal discharge, if you are female.  Pain in the abdomen or the lower back.  You may also have:  Vomiting or a decreased appetite.  Confusion.  Irritability or tiredness.  A fever or chills.  Diarrhea.  The first symptom in older adults may be confusion. In some cases, they may not have any symptoms until the infection has worsened.    How is this diagnosed?  This condition is diagnosed based on your medical history and a physical exam. You may also have other tests, including:  Urine tests.  Blood tests.  Tests for STIs (sexually transmitted infections).  If you have had more than one UTI, a cystoscopy or imaging studies may be done to determine the cause of the infections.    How is this treated?  Treatment for this condition includes:  Antibiotic medicine.  Over-the-counter medicines to treat discomfort.  Drinking enough water to stay hydrated.  If you have frequent infections or have other conditions such as a kidney stone, you may need to see a health care provider who specializes in the urinary tract (urologist).    In rare cases, urinary tract infections can cause sepsis. Sepsis is a life-threatening condition that occurs when the body responds to an infection. Sepsis is treated in the hospital with IV antibiotics, fluids, and other medicines.    Follow these instructions at home:    Medicines    Take over-the-counter and prescription medicines only as told by your health care provider.  If you were prescribed an antibiotic medicine, take it as told by your health care provider. Do not stop using the antibiotic even if you start to feel better.  General instructions    Make sure you:  Empty your bladder often and completely. Do not hold urine for long periods of time.  Empty your bladder after sex.  Wipe from front to back after urinating or having a bowel movement if you are female. Use each tissue only one time when you wipe.  Drink enough fluid to keep your urine pale yellow.  Keep all follow-up visits. This is important.  Contact a health care provider if:  Your symptoms do not get better after 1–2 days.  Your symptoms go away and then return.  Get help right away if:  You have severe pain in your back or your lower abdomen.  You have a fever or chills.  You have nausea or vomiting.  Summary  A urinary tract infection (UTI) is an infection of any part of the urinary tract, which includes the kidneys, ureters, bladder, and urethra.  Most urinary tract infections are caused by bacteria in your genital area.  Treatment for this condition often includes antibiotic medicines.  If you were prescribed an antibiotic medicine, take it as told by your health care provider. Do not stop using the antibiotic even if you start to feel better.  Keep all follow-up visits. This is important.  This information is not intended to replace advice given to you by your health care provider. Make sure you discuss any questions you have with your health care provider.    Document Revised: 07/30/2021 Document Reviewed: 07/30/2021  [employer] Patient Education © 2023 [employer]

## 2023-08-15 NOTE — ED PROVIDER NOTE - PROGRESS NOTE DETAILS
pt asymptomatic. w/u noted. labs unremarkable. Discussed the results of all diagnostic testing in ED and copies of all reports given.   Pt was given an opportunity to have all questions answered to satisfaction.  Discussed the importance of prompt, close medical follow-up. ED return precautions discussed at length.  Pt verbalizes agreement and understanding of plan and ED return precautions. Pt well appearing, stable for DC home. No emergent concerns at this time.

## 2023-08-15 NOTE — ED PROVIDER NOTE - CONSIDERATION OF ADMISSION OBSERVATION
Consideration of Admission/Observation Pending results: for further work up, treatment or evaluation. infected kidney stone

## 2023-08-15 NOTE — ED ADULT TRIAGE NOTE - PAIN: PRESENCE, MLM
2/26/2020. [facility] () series of 3. BILATERALLY. Philip Alvarado # [id number].  2/26/2020-4/22/2020. VALID & BILLABLE ON CLAIM YES. BUY AND BILL. Per FAX from [facility]. complains of pain/discomfort

## 2023-08-15 NOTE — ED ADULT NURSE NOTE - NSFALLUNIVINTERV_ED_ALL_ED
Bed/Stretcher in lowest position, wheels locked, appropriate side rails in place/Call bell, personal items and telephone in reach/Instruct patient to call for assistance before getting out of bed/chair/stretcher/Non-slip footwear applied when patient is off stretcher/[place] to call system/Physically safe environment - no spills, clutter or unnecessary equipment/Purposeful proactive rounding/Room/bathroom lighting operational, light cord in reach

## 2023-08-20 LAB
CULTURE RESULTS: SIGNIFICANT CHANGE UP
CULTURE RESULTS: SIGNIFICANT CHANGE UP
SPECIMEN SOURCE: SIGNIFICANT CHANGE UP
SPECIMEN SOURCE: SIGNIFICANT CHANGE UP

## 2023-08-29 ENCOUNTER — APPOINTMENT (OUTPATIENT)
Dept: DERMATOLOGY | Facility: CLINIC | Age: 63
End: 2023-08-29
Payer: COMMERCIAL

## 2023-08-29 PROCEDURE — 99213 OFFICE O/P EST LOW 20 MIN: CPT

## 2023-11-06 ENCOUNTER — TRANSCRIPTION ENCOUNTER (OUTPATIENT)
Age: 63
End: 2023-11-06

## 2023-12-17 NOTE — H&P PST ADULT - FUNCTIONAL ASSESSMENT - DAILY ACTIVITY 1.
86 year old man with T2Dm, well controlled, with unexolained neurologist symptoms, transfered to [facility] post cardiac arrest, noted to have euglycemic dka (on SGLT2 at home), transitioned off insulin drip.    T2DM with eDKA  A1c 5.7%  Home regimen - needs to be confirmed, per chart Dapagliflozin 10 mg  Slight increase in BHB, though AG 14  Recommend increase lantus to 7 units  Pt is now eating, start ademlog 2 units before meals  Repeat bmp and bhb.  check c-peptide  Hold SGLT2  Discharge recommendations will depend on clinical course and glucose values.  Would continue off SGLT2 for now.      HTN  recently requiring pressors.  Hold antihypertensive agents for now    Hypertriglyeridemia  LFT elevation in setting of shock. Hold fibrate for now.    Discussed with primary team    Annia Hopkins MD  pager  or via Teams on 12/17/23  Other times:  Diabetes team: [phone]   or email [email or web address] 86 year old man with T2Dm, well controlled, with unexolained neurologist symptoms, transfered to [facility] post cardiac arrest, noted to have euglycemic dka (on SGLT2 at home), transitioned off insulin drip.    T2DM with eDKA  A1c 5.7%  Home regimen - needs to be confirmed, per chart Dapagliflozin 10 mg  Slight increase in BHB, though AG 14  Recommend increase lantus to 7 units  Pt is now eating, start ademlog 2 units before meals  Repeat bmp and bhb.  check c-peptide  Hold SGLT2  Discharge recommendations will depend on clinical course and glucose values.  Would continue off SGLT2 for now.      HTN  recently requiring pressors.  Hold antihypertensive agents for now    Hypertriglyeridemia  LFT elevation in setting of shock. Hold fibrate for now.    Discussed with primary team    Annia Hopkins MD  pager  or via Teams on 12/17/23  Other times:  Diabetes team: [phone]   or email [email or web address] = No assist / stand by assistance

## 2024-05-01 ENCOUNTER — OFFICE (OUTPATIENT)
Dept: URBAN - METROPOLITAN AREA CLINIC 104 | Facility: CLINIC | Age: 64
Setting detail: OPHTHALMOLOGY
End: 2024-05-01
Payer: COMMERCIAL

## 2024-05-01 DIAGNOSIS — B30.9: ICD-10-CM

## 2024-05-01 PROCEDURE — 99213 OFFICE O/P EST LOW 20 MIN: CPT | Performed by: OPTOMETRIST

## 2024-05-01 ASSESSMENT — CONFRONTATIONAL VISUAL FIELD TEST (CVF)
OD_FINDINGS: FULL
OS_FINDINGS: FULL

## 2024-05-01 ASSESSMENT — LID EXAM ASSESSMENTS
OS_BLEPHARITIS: LLL LUL 1+ 2+
OD_BLEPHARITIS: RLL RUL 1+ 2+

## 2024-05-15 ENCOUNTER — OFFICE (OUTPATIENT)
Dept: URBAN - METROPOLITAN AREA CLINIC 104 | Facility: CLINIC | Age: 64
Setting detail: OPHTHALMOLOGY
End: 2024-05-15
Payer: COMMERCIAL

## 2024-05-15 ENCOUNTER — RX ONLY (RX ONLY)
Age: 64
End: 2024-05-15

## 2024-05-15 DIAGNOSIS — B30.9: ICD-10-CM

## 2024-05-15 PROBLEM — H01.005 BLEPHARITIS; RIGHT UPPER LID, RIGHT LOWER LID, LEFT UPPER LID, LEFT LOWER LID: Status: ACTIVE | Noted: 2024-05-01

## 2024-05-15 PROBLEM — H01.004 BLEPHARITIS; RIGHT UPPER LID, RIGHT LOWER LID, LEFT UPPER LID, LEFT LOWER LID: Status: ACTIVE | Noted: 2024-05-01

## 2024-05-15 PROBLEM — H01.002 BLEPHARITIS; RIGHT UPPER LID, RIGHT LOWER LID, LEFT UPPER LID, LEFT LOWER LID: Status: ACTIVE | Noted: 2024-05-01

## 2024-05-15 PROBLEM — H01.001 BLEPHARITIS; RIGHT UPPER LID, RIGHT LOWER LID, LEFT UPPER LID, LEFT LOWER LID: Status: ACTIVE | Noted: 2024-05-01

## 2024-05-15 PROCEDURE — 92012 INTRM OPH EXAM EST PATIENT: CPT | Performed by: OPTOMETRIST

## 2024-05-15 ASSESSMENT — LID EXAM ASSESSMENTS
OS_BLEPHARITIS: LLL LUL 1+ 2+
OD_BLEPHARITIS: RLL RUL 1+ 2+

## 2024-06-04 ENCOUNTER — APPOINTMENT (OUTPATIENT)
Dept: DERMATOLOGY | Facility: CLINIC | Age: 64
End: 2024-06-04
Payer: COMMERCIAL

## 2024-06-04 PROCEDURE — 99213 OFFICE O/P EST LOW 20 MIN: CPT

## 2024-06-20 ENCOUNTER — OFFICE (OUTPATIENT)
Dept: URBAN - METROPOLITAN AREA CLINIC 104 | Facility: CLINIC | Age: 64
Setting detail: OPHTHALMOLOGY
End: 2024-06-20
Payer: COMMERCIAL

## 2024-06-20 DIAGNOSIS — H01.004: ICD-10-CM

## 2024-06-20 DIAGNOSIS — H35.412: ICD-10-CM

## 2024-06-20 DIAGNOSIS — H25.13: ICD-10-CM

## 2024-06-20 DIAGNOSIS — H01.005: ICD-10-CM

## 2024-06-20 DIAGNOSIS — H01.001: ICD-10-CM

## 2024-06-20 DIAGNOSIS — H01.002: ICD-10-CM

## 2024-06-20 DIAGNOSIS — H35.033: ICD-10-CM

## 2024-06-20 PROCEDURE — 92250 FUNDUS PHOTOGRAPHY W/I&R: CPT | Performed by: OPTOMETRIST

## 2024-06-20 PROCEDURE — 92014 COMPRE OPH EXAM EST PT 1/>: CPT | Performed by: OPTOMETRIST

## 2024-06-20 ASSESSMENT — CONFRONTATIONAL VISUAL FIELD TEST (CVF)
OD_FINDINGS: FULL
OS_FINDINGS: FULL

## 2024-06-20 ASSESSMENT — LID EXAM ASSESSMENTS
OS_BLEPHARITIS: LLL LUL 1+ 2+
OD_BLEPHARITIS: RLL RUL 1+ 2+

## 2025-01-06 ENCOUNTER — NON-APPOINTMENT (OUTPATIENT)
Age: 65
End: 2025-01-06

## 2025-01-06 ENCOUNTER — APPOINTMENT (OUTPATIENT)
Dept: ORTHOPEDIC SURGERY | Facility: CLINIC | Age: 65
End: 2025-01-06
Payer: COMMERCIAL

## 2025-01-06 DIAGNOSIS — M75.122 COMPLETE ROTATOR CUFF TEAR OR RUPTURE OF LEFT SHOULDER, NOT SPECIFIED AS TRAUMATIC: ICD-10-CM

## 2025-01-06 PROCEDURE — 73030 X-RAY EXAM OF SHOULDER: CPT | Mod: LT

## 2025-01-06 PROCEDURE — 99204 OFFICE O/P NEW MOD 45 MIN: CPT

## 2025-02-04 ENCOUNTER — OUTPATIENT (OUTPATIENT)
Dept: OUTPATIENT SERVICES | Facility: HOSPITAL | Age: 65
LOS: 1 days | End: 2025-02-04
Payer: COMMERCIAL

## 2025-02-04 ENCOUNTER — NON-APPOINTMENT (OUTPATIENT)
Age: 65
End: 2025-02-04

## 2025-02-04 VITALS
RESPIRATION RATE: 15 BRPM | TEMPERATURE: 98 F | HEART RATE: 60 BPM | WEIGHT: 194.01 LBS | HEIGHT: 69 IN | OXYGEN SATURATION: 97 % | SYSTOLIC BLOOD PRESSURE: 122 MMHG | DIASTOLIC BLOOD PRESSURE: 74 MMHG

## 2025-02-04 DIAGNOSIS — Z98.890 OTHER SPECIFIED POSTPROCEDURAL STATES: Chronic | ICD-10-CM

## 2025-02-04 DIAGNOSIS — M75.122 COMPLETE ROTATOR CUFF TEAR OR RUPTURE OF LEFT SHOULDER, NOT SPECIFIED AS TRAUMATIC: ICD-10-CM

## 2025-02-04 DIAGNOSIS — Z95.1 PRESENCE OF AORTOCORONARY BYPASS GRAFT: Chronic | ICD-10-CM

## 2025-02-04 DIAGNOSIS — Z01.818 ENCOUNTER FOR OTHER PREPROCEDURAL EXAMINATION: ICD-10-CM

## 2025-02-04 DIAGNOSIS — Z95.5 PRESENCE OF CORONARY ANGIOPLASTY IMPLANT AND GRAFT: Chronic | ICD-10-CM

## 2025-02-04 LAB
ANION GAP SERPL CALC-SCNC: 3 MMOL/L — LOW (ref 5–17)
APTT BLD: 35.4 SEC — SIGNIFICANT CHANGE UP (ref 24.5–35.6)
BASOPHILS # BLD AUTO: 0.04 K/UL — SIGNIFICANT CHANGE UP (ref 0–0.2)
BASOPHILS NFR BLD AUTO: 0.5 % — SIGNIFICANT CHANGE UP (ref 0–2)
BUN SERPL-MCNC: 30 MG/DL — HIGH (ref 7–23)
CALCIUM SERPL-MCNC: 9.9 MG/DL — SIGNIFICANT CHANGE UP (ref 8.5–10.1)
CHLORIDE SERPL-SCNC: 107 MMOL/L — SIGNIFICANT CHANGE UP (ref 96–108)
CO2 SERPL-SCNC: 29 MMOL/L — SIGNIFICANT CHANGE UP (ref 22–31)
CREAT SERPL-MCNC: 1.11 MG/DL — SIGNIFICANT CHANGE UP (ref 0.5–1.3)
EGFR: 74 ML/MIN/1.73M2 — SIGNIFICANT CHANGE UP
EOSINOPHIL # BLD AUTO: 0.15 K/UL — SIGNIFICANT CHANGE UP (ref 0–0.5)
EOSINOPHIL NFR BLD AUTO: 2 % — SIGNIFICANT CHANGE UP (ref 0–6)
GLUCOSE SERPL-MCNC: 104 MG/DL — HIGH (ref 70–99)
HCT VFR BLD CALC: 43 % — SIGNIFICANT CHANGE UP (ref 39–50)
HGB BLD-MCNC: 14.6 G/DL — SIGNIFICANT CHANGE UP (ref 13–17)
IMM GRANULOCYTES NFR BLD AUTO: 0.3 % — SIGNIFICANT CHANGE UP (ref 0–0.9)
INR BLD: 0.96 RATIO — SIGNIFICANT CHANGE UP (ref 0.85–1.16)
LYMPHOCYTES # BLD AUTO: 1.35 K/UL — SIGNIFICANT CHANGE UP (ref 1–3.3)
LYMPHOCYTES # BLD AUTO: 17.8 % — SIGNIFICANT CHANGE UP (ref 13–44)
MCHC RBC-ENTMCNC: 30.6 PG — SIGNIFICANT CHANGE UP (ref 27–34)
MCHC RBC-ENTMCNC: 34 G/DL — SIGNIFICANT CHANGE UP (ref 32–36)
MCV RBC AUTO: 90.1 FL — SIGNIFICANT CHANGE UP (ref 80–100)
MONOCYTES # BLD AUTO: 0.51 K/UL — SIGNIFICANT CHANGE UP (ref 0–0.9)
MONOCYTES NFR BLD AUTO: 6.7 % — SIGNIFICANT CHANGE UP (ref 2–14)
NEUTROPHILS # BLD AUTO: 5.53 K/UL — SIGNIFICANT CHANGE UP (ref 1.8–7.4)
NEUTROPHILS NFR BLD AUTO: 72.7 % — SIGNIFICANT CHANGE UP (ref 43–77)
PLATELET # BLD AUTO: 216 K/UL — SIGNIFICANT CHANGE UP (ref 150–400)
POTASSIUM SERPL-MCNC: 4.4 MMOL/L — SIGNIFICANT CHANGE UP (ref 3.5–5.3)
POTASSIUM SERPL-SCNC: 4.4 MMOL/L — SIGNIFICANT CHANGE UP (ref 3.5–5.3)
PROTHROM AB SERPL-ACNC: 11.3 SEC — SIGNIFICANT CHANGE UP (ref 9.9–13.4)
RBC # BLD: 4.77 M/UL — SIGNIFICANT CHANGE UP (ref 4.2–5.8)
RBC # FLD: 12 % — SIGNIFICANT CHANGE UP (ref 10.3–14.5)
SODIUM SERPL-SCNC: 139 MMOL/L — SIGNIFICANT CHANGE UP (ref 135–145)
WBC # BLD: 7.6 K/UL — SIGNIFICANT CHANGE UP (ref 3.8–10.5)
WBC # FLD AUTO: 7.6 K/UL — SIGNIFICANT CHANGE UP (ref 3.8–10.5)

## 2025-02-04 PROCEDURE — 36415 COLL VENOUS BLD VENIPUNCTURE: CPT

## 2025-02-04 PROCEDURE — 99214 OFFICE O/P EST MOD 30 MIN: CPT | Mod: 25

## 2025-02-04 PROCEDURE — 85610 PROTHROMBIN TIME: CPT

## 2025-02-04 PROCEDURE — 85025 COMPLETE CBC W/AUTO DIFF WBC: CPT

## 2025-02-04 PROCEDURE — 85730 THROMBOPLASTIN TIME PARTIAL: CPT

## 2025-02-04 PROCEDURE — 86803 HEPATITIS C AB TEST: CPT

## 2025-02-04 PROCEDURE — 93005 ELECTROCARDIOGRAM TRACING: CPT

## 2025-02-04 PROCEDURE — 80048 BASIC METABOLIC PNL TOTAL CA: CPT

## 2025-02-04 PROCEDURE — 93010 ELECTROCARDIOGRAM REPORT: CPT

## 2025-02-04 NOTE — H&P PST ADULT - NSICDXPASTMEDICALHX_GEN_ALL_CORE_FT
PAST MEDICAL HISTORY:  CAD (coronary artery disease)     GERD (gastroesophageal reflux disease)     History of diverticulosis     HLD (hyperlipidemia)     HTN (hypertension)     Lumbar stenosis     Lung nodule     ROCHELLE on CPAP      PAST MEDICAL HISTORY:  CAD (coronary artery disease)     Complete tear of left rotator cuff     GERD (gastroesophageal reflux disease)     History of diverticulosis     HLD (hyperlipidemia)     HTN (hypertension)     Lumbar stenosis     Lung nodule     ROCHELLE on CPAP

## 2025-02-04 NOTE — H&P PST ADULT - NSICDXPASTSURGICALHX_GEN_ALL_CORE_FT
PAST SURGICAL HISTORY:  S/P drug eluting coronary stent placement      PAST SURGICAL HISTORY:  H/O carpal tunnel repair     S/P drug eluting coronary stent placement     S/P triple vessel bypass

## 2025-02-04 NOTE — H&P PST ADULT - ASSESSMENT
Plan:  - PST instructions given; NPO status/instructions to be given by ASU   - Pt instructed to take following meds on day of surgery:   - Pt instructed to take routine evening medications unless indicated   - Stop NSAIDS ( Aspirin Aleve Motrin Mobic Diclofenac), herbal supplements, MVI, Vitamin E, fish oil 7 days prior to surgery unless directed by surgeon or cardiologist;   - Medical Optimization with Dr Echols, Cardiac Optimization with Dr. Keys.  aspirin instruction per Cardiology  - EZ wash instructions given & mupirocin instructions given  - CBC w/diff, BMP, PT/INR, aPTT done today  - EKG done today   64 year old male presents to PST for planned left shoulder arthroscopy, rotator cuff repair, subacromial decompression, acromioplasty, extensive debridement and biceps tenodesis.    Plan:  - PST instructions given; NPO status/instructions to be given by ASU   - Pt instructed to take following meds on day of surgery: none  - Pt instructed to take routine evening medications unless indicated   - Stop NSAIDS ( Aspirin Aleve Motrin Mobic Diclofenac), herbal supplements, MVI, Vitamin E, fish oil 7 days prior to surgery unless directed by surgeon or cardiologist;   - Medical Optimization with Dr Echols, Cardiac Optimization with Dr. Keys.  aspirin instruction per Cardiology  - EZ wash instructions given   - CBC w/diff, BMP, PT/INR, aPTT done today  - EKG done today

## 2025-02-04 NOTE — H&P PST ADULT - MUSCULOSKELETAL
left shuolder/no calf tenderness/decreased ROM due to pain left shoulder/no calf tenderness/decreased ROM due to pain details…

## 2025-02-04 NOTE — H&P PST ADULT - HISTORY OF PRESENT ILLNESS
64 year old male  Right shuolder worsening years   64 year old male with left shoulder pain worsening over the past few years and has now become unbearable; patient reports pain aggravated by certain movements; sleep poor due to pain; Patient s/p evaluation with Dr. Briggs, found to have complete tear of left rotator cuff. Patient presents to [facility] for planned left shoulder arthroscopy, rotator cuff repair, subacromial decompression, acromioplasty, extensive debridement and biceps tenodesis.

## 2025-02-04 NOTE — H&P PST ADULT - NSICDXFAMILYHX_GEN_ALL_CORE_FT
FAMILY HISTORY:  Father  Still living? Unknown  Family history of early CAD, Age at diagnosis: Age Unknown    Mother  Still living? No  FH: ovarian cancer, Age at diagnosis: Age Unknown    Sibling  Still living? Yes, Estimated age: 51-60  Family history of DVT, Age at diagnosis: Age Unknown    Grandparent  Still living? No  FH: colon cancer, Age at diagnosis: Age Unknown

## 2025-02-05 DIAGNOSIS — Z01.818 ENCOUNTER FOR OTHER PREPROCEDURAL EXAMINATION: ICD-10-CM

## 2025-02-05 DIAGNOSIS — M75.122 COMPLETE ROTATOR CUFF TEAR OR RUPTURE OF LEFT SHOULDER, NOT SPECIFIED AS TRAUMATIC: ICD-10-CM

## 2025-02-05 LAB
HCV AB S/CO SERPL IA: 0.11 S/CO — SIGNIFICANT CHANGE UP (ref 0–0.99)
HCV AB SERPL-IMP: SIGNIFICANT CHANGE UP

## 2025-02-17 PROBLEM — K21.9 GASTRO-ESOPHAGEAL REFLUX DISEASE WITHOUT ESOPHAGITIS: Chronic | Status: ACTIVE | Noted: 2025-02-04

## 2025-02-17 PROBLEM — M48.061 SPINAL STENOSIS, LUMBAR REGION WITHOUT NEUROGENIC CLAUDICATION: Chronic | Status: ACTIVE | Noted: 2025-02-04

## 2025-02-17 PROBLEM — R91.1 SOLITARY PULMONARY NODULE: Chronic | Status: ACTIVE | Noted: 2025-02-04

## 2025-02-17 PROBLEM — M75.122 COMPLETE ROTATOR CUFF TEAR OR RUPTURE OF LEFT SHOULDER, NOT SPECIFIED AS TRAUMATIC: Chronic | Status: ACTIVE | Noted: 2025-02-04

## 2025-02-19 ENCOUNTER — APPOINTMENT (OUTPATIENT)
Dept: ORTHOPEDIC SURGERY | Facility: HOSPITAL | Age: 65
End: 2025-02-19

## 2025-02-19 ENCOUNTER — OUTPATIENT (OUTPATIENT)
Dept: INPATIENT UNIT | Facility: HOSPITAL | Age: 65
LOS: 1 days | Discharge: HOME | End: 2025-02-19
Payer: COMMERCIAL

## 2025-02-19 ENCOUNTER — TRANSCRIPTION ENCOUNTER (OUTPATIENT)
Age: 65
End: 2025-02-19

## 2025-02-19 VITALS
DIASTOLIC BLOOD PRESSURE: 78 MMHG | OXYGEN SATURATION: 97 % | RESPIRATION RATE: 16 BRPM | HEIGHT: 69 IN | HEART RATE: 64 BPM | TEMPERATURE: 97 F | SYSTOLIC BLOOD PRESSURE: 139 MMHG | WEIGHT: 195.11 LBS

## 2025-02-19 VITALS
OXYGEN SATURATION: 100 % | TEMPERATURE: 97 F | HEART RATE: 74 BPM | DIASTOLIC BLOOD PRESSURE: 70 MMHG | RESPIRATION RATE: 16 BRPM | SYSTOLIC BLOOD PRESSURE: 132 MMHG

## 2025-02-19 DIAGNOSIS — M75.122 COMPLETE ROTATOR CUFF TEAR OR RUPTURE OF LEFT SHOULDER, NOT SPECIFIED AS TRAUMATIC: ICD-10-CM

## 2025-02-19 DIAGNOSIS — Z95.5 PRESENCE OF CORONARY ANGIOPLASTY IMPLANT AND GRAFT: Chronic | ICD-10-CM

## 2025-02-19 DIAGNOSIS — Z95.1 PRESENCE OF AORTOCORONARY BYPASS GRAFT: Chronic | ICD-10-CM

## 2025-02-19 DIAGNOSIS — Z98.890 OTHER SPECIFIED POSTPROCEDURAL STATES: Chronic | ICD-10-CM

## 2025-02-19 PROCEDURE — 29827 SHO ARTHRS SRG RT8TR CUF RPR: CPT | Mod: LT

## 2025-02-19 PROCEDURE — 29826 SHO ARTHRS SRG DECOMPRESSION: CPT | Mod: LT

## 2025-02-19 PROCEDURE — 29823 SHO ARTHRS SRG XTNSV DBRDMT: CPT | Mod: LT

## 2025-02-19 PROCEDURE — C9399: CPT

## 2025-02-19 PROCEDURE — C1713: CPT

## 2025-02-19 PROCEDURE — 29828 SHO ARTHRS SRG BICP TENODSIS: CPT | Mod: LT

## 2025-02-19 RX ORDER — KETOROLAC TROMETHAMINE 10 MG
15 TABLET ORAL ONCE
Refills: 0 | Status: DISCONTINUED | OUTPATIENT
Start: 2025-02-19 | End: 2025-02-19

## 2025-02-19 RX ORDER — FENTANYL CITRATE 50 UG/ML
50 INJECTION INTRAMUSCULAR; INTRAVENOUS
Refills: 0 | Status: DISCONTINUED | OUTPATIENT
Start: 2025-02-19 | End: 2025-02-19

## 2025-02-19 RX ORDER — CYANOCOBALAMIN (VITAMIN B-12) 1000MCG/ML
1 VIAL (ML) INJECTION
Refills: 0 | DISCHARGE

## 2025-02-19 RX ORDER — SODIUM CHLORIDE 9 G/ML
1000 INJECTION, SOLUTION INTRAVENOUS
Refills: 0 | Status: DISCONTINUED | OUTPATIENT
Start: 2025-02-19 | End: 2025-02-19

## 2025-02-19 RX ORDER — FLUTICASONE PROPIONATE 50 UG/1
1 SPRAY, METERED NASAL
Refills: 0 | DISCHARGE

## 2025-02-19 RX ORDER — AZELASTINE HCL 137 MCG
1 AEROSOL, SPRAY WITH PUMP (ML) NASAL
Refills: 0 | DISCHARGE

## 2025-02-19 RX ORDER — OXYCODONE HYDROCHLORIDE 30 MG/1
5 TABLET ORAL ONCE
Refills: 0 | Status: DISCONTINUED | OUTPATIENT
Start: 2025-02-19 | End: 2025-02-19

## 2025-02-19 RX ORDER — DOCUSATE SODIUM 100 MG
1 CAPSULE ORAL
Qty: 14 | Refills: 0
Start: 2025-02-19 | End: 2025-02-25

## 2025-02-19 RX ORDER — ONDANSETRON 4 MG/1
1 TABLET, ORALLY DISINTEGRATING ORAL
Qty: 28 | Refills: 0
Start: 2025-02-19 | End: 2025-02-25

## 2025-02-19 RX ORDER — MELOXICAM 7.5 MG
1 TABLET ORAL
Refills: 0 | DISCHARGE

## 2025-02-19 RX ORDER — OXYCODONE HYDROCHLORIDE 30 MG/1
1 TABLET ORAL
Qty: 20 | Refills: 0
Start: 2025-02-19 | End: 2025-02-23

## 2025-02-19 RX ORDER — ALFUZOSIN HYDROCHLORIDE 10 MG/1
1 TABLET, EXTENDED RELEASE ORAL
Refills: 0 | DISCHARGE

## 2025-02-19 RX ORDER — ONDANSETRON 4 MG/1
4 TABLET, ORALLY DISINTEGRATING ORAL ONCE
Refills: 0 | Status: COMPLETED | OUTPATIENT
Start: 2025-02-19 | End: 2025-02-19

## 2025-02-19 RX ORDER — NALOXONE HYDROCHLORIDE 3 MG/.1ML
1 SPRAY NASAL
Qty: 1 | Refills: 0
Start: 2025-02-19 | End: 2025-02-19

## 2025-02-19 RX ORDER — NEBIVOLOL 10 MG/1
1 TABLET ORAL
Refills: 0 | DISCHARGE

## 2025-02-19 RX ADMIN — OXYCODONE HYDROCHLORIDE 5 MILLIGRAM(S): 30 TABLET ORAL at 11:25

## 2025-02-19 RX ADMIN — OXYCODONE HYDROCHLORIDE 5 MILLIGRAM(S): 30 TABLET ORAL at 12:15

## 2025-02-19 RX ADMIN — FENTANYL CITRATE 50 MICROGRAM(S): 50 INJECTION INTRAMUSCULAR; INTRAVENOUS at 11:25

## 2025-02-19 RX ADMIN — Medication 15 MILLIGRAM(S): at 11:15

## 2025-02-19 RX ADMIN — FENTANYL CITRATE 50 MICROGRAM(S): 50 INJECTION INTRAMUSCULAR; INTRAVENOUS at 11:15

## 2025-02-19 RX ADMIN — SODIUM CHLORIDE 75 MILLILITER(S): 9 INJECTION, SOLUTION INTRAVENOUS at 11:05

## 2025-02-19 RX ADMIN — ONDANSETRON 4 MILLIGRAM(S): 4 TABLET, ORALLY DISINTEGRATING ORAL at 14:50

## 2025-02-19 RX ADMIN — Medication 15 MILLIGRAM(S): at 11:30

## 2025-02-19 RX ADMIN — FENTANYL CITRATE 50 MICROGRAM(S): 50 INJECTION INTRAMUSCULAR; INTRAVENOUS at 11:05

## 2025-02-19 RX ADMIN — FENTANYL CITRATE 50 MICROGRAM(S): 50 INJECTION INTRAMUSCULAR; INTRAVENOUS at 11:45

## 2025-02-19 NOTE — ASU DISCHARGE PLAN (ADULT/PEDIATRIC) - FINANCIAL ASSISTANCE
[facility] provides services at a reduced cost to those who are determined to be eligible through [facility]’s financial assistance program. Information regarding [facility]’s financial assistance program can be found by going to [email or web address] or by calling [phone].

## 2025-02-19 NOTE — ASU DISCHARGE PLAN (ADULT/PEDIATRIC) - NURSING INSTRUCTIONS
For any problems or concerns,contact your doctor. [facility] patients should call the [facility]. If you cannot reach the doctor or clinic, call [facility] Emergency Department at [phone] or go to your local Emergency Department.  A responsible adult should be with you for the rest of the day and night for your safety and to help you if you needed. Resume your medications as listed on the attached Medication Record. Begin with liquids and light food ( tea, toast, Jello, soups). Advance to what you normally eat. Liquids should taken in adequate amounts today.     CALL the DOCTOR:    -Fever greater than  101F  - Signs  of infection such as : increase pain,swelling,redness,or a bad  smell coming from the wound.  -Excessive amount of bleeding.  - Any pain that appears to be getting worse.  - Vomiting  -  If you have  not urinated 8 hours after surgery or have any difficulty urinating.     A responsible adult should be with you for the rest of the day and night for your safety and to help you if you needed.    Review attached FACT SHEET if applicable.

## 2025-02-19 NOTE — BRIEF OPERATIVE NOTE - OPERATION/FINDINGS
left shoulder supraspinatus tear, diffuse labral fraying involving biceps insertion, subacromial impingement

## 2025-02-19 NOTE — PROCEDURAL SAFETY CHECKLIST WITH OR WITHOUT SEDATION - NSPRESEDATION2FT_GEN_ALL_CORE
Care of the patient is governed by the Department of Anesthesia policy and procedure manual.
Care of the patient is governed by the Department of Anesthesia policy and procedure manual.

## 2025-02-19 NOTE — ASU DISCHARGE PLAN (ADULT/PEDIATRIC) - CARE PROVIDER_API CALL
Norman Briggs  Orthopaedic Sports Medicine  [address], [address]  [place] [address]  Phone: [phone]  Fax: [phone]  Follow Up Time: 2 weeks

## 2025-02-19 NOTE — BRIEF OPERATIVE NOTE - NSICDXBRIEFPROCEDURE_GEN_ALL_CORE_FT
PROCEDURES:  Arthroscopic repair of left rotator cuff 19-Feb-2025 10:39:53 biceps tenodesis, debridement, subacromial decompression Matt Low

## 2025-02-19 NOTE — ASU PATIENT PROFILE, ADULT - BLOOD AVOIDANCE/RESTRICTIONS, PROFILE
The patient called to inform her ILD is out of control. The patients states it started out as a sinus infection and the patient called her PCP who prescribed the patient a Z pack and low dose prednisone. The patient states she coughs so bad cannot speak and feels like she is drowning in mucus. The patient feels like her her lungs are very inflamed.  The patient states she is taking meds as prescribed.    In the past, Dr Molina has ordered the patient a high dose prednisone and is inquiring if that can be ordered. If medication is prescribed, the patient's preferred pharmacy is the [facility] in [place].    Please review and contact the patient to discuss.   none

## 2025-02-19 NOTE — ASU PREOP CHECKLIST - SKIN PREP
Problem: Knowledge Deficit  Goal: Patient/family/caregiver demonstrates understanding of disease process, treatment plan, medications, and discharge instructions  Description: Complete learning assessment and assess knowledge base.  Interventions:  - Provide teaching at level of understanding  - Provide teaching via preferred learning methods  Outcome: Progressing      pao on admission/done

## 2025-02-19 NOTE — ASU DISCHARGE PLAN (ADULT/PEDIATRIC) - ASU DC SPECIAL INSTRUCTIONSFT
POST-OPERATIVE INSTRUCTION SHEET: Left Shoulder Arthroscopy      MEDICATIONS: You will be given a prescription for pain medication prior to discharge. This medication may be taken as directed for breakthrough pain. You should try taking Extra Strength Tylenol first (500 mg every 4 hours) which is available over-the-counter. Be sure not to exceed 3,000 mg in 24 hours. We ask that you avoid NSAIDs (Advil, Motrin, Aleve, ibuprofen, etc.) for the first few weeks after your surgery as these may interrupt tendon healing. You will also be given a prescription for antinausea medication, Zofran.    You should take a stool softener while you are taking narcotics. The pain medication can cause significant constipation. Miralax or Senna can be purchased over the counter and is taken twice daily.     ICE: An ice device or an ice bag (not directly touching the skin) should be utilized to reduce swelling and pain. Please ice every 3-4 hours for about 15-20 minutes each time for at least the first 5 days or until swelling subsides. We have Polar Ice devices in our office available for purchase and our staff would be happy to assist you with this. Although this is helpful, it is not mandatory and available to you only if you would like one.      SLING: Non-weight Bearing Left Upper Extremity with Sling. You will be given a sling, in some cases with an abduction pillow. This should be worn for at least 4-6 weeks unless directed by Dr. Briggs or his PA. The sling may be removed for hygiene and for exercises. You should sleep with the sling on.     EXERCISES: Pendulum exercises to be performed for 3-5 minutes every 1-2 hours. When lying in bed, elevate the head of your bed. Move your fingers, hand and elbow to increase circulation. DO NOT lift your arm at the shoulder as to avoid destroying the repair, until it has healed (when Cleared by Dr. Briggs)    PHYSICAL THERAPY: You will begin PT usually 2 weeks after surgery and a PT prescription and protocol will be given to you at your first post-operative appointment. Please plan to begin PT within a week of this appointment. You will schedule this on your own but we can assist you in finding a convenient facility.      WOUND CARE:  Leave your surgical dressing on for 3 days. After 3 days you may remove your dressing and shower. Dry the incisions well and apply a small dressing or Band-Aid over the incisions. Keeping these areas dry and clean is very important.     FOLLOW UP: If you do not already have a follow-up visit scheduled, please call [phone] to schedule one with Dr. Briggs or his PA within 7-10 days for suture removal and to receive the PT prescription and protocol.

## 2025-02-23 DIAGNOSIS — S43.432A SUPERIOR GLENOID LABRUM LESION OF LEFT SHOULDER, INITIAL ENCOUNTER: ICD-10-CM
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
## 2025-02-23 DIAGNOSIS — I10 ESSENTIAL (PRIMARY) HYPERTENSION: ICD-10-CM

## 2025-02-23 DIAGNOSIS — I25.10 ATHEROSCLEROTIC HEART DISEASE OF NATIVE CORONARY ARTERY WITHOUT ANGINA PECTORIS: ICD-10-CM
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
## 2025-02-23 DIAGNOSIS — M75.122 COMPLETE ROTATOR CUFF TEAR OR RUPTURE OF LEFT SHOULDER, NOT SPECIFIED AS TRAUMATIC: ICD-10-CM

## 2025-02-23 DIAGNOSIS — M19.012 PRIMARY OSTEOARTHRITIS, LEFT SHOULDER: ICD-10-CM

## 2025-02-23 DIAGNOSIS — Z79.82 LONG TERM (CURRENT) USE OF ASPIRIN: ICD-10-CM

## 2025-02-23 DIAGNOSIS — E78.5 HYPERLIPIDEMIA, UNSPECIFIED: ICD-10-CM

## 2025-02-23 DIAGNOSIS — M94.212 CHONDROMALACIA, LEFT SHOULDER: ICD-10-CM

## 2025-02-23 DIAGNOSIS — G47.33 OBSTRUCTIVE SLEEP APNEA (ADULT) (PEDIATRIC): ICD-10-CM

## 2025-02-23 DIAGNOSIS — M75.42 IMPINGEMENT SYNDROME OF LEFT SHOULDER: ICD-10-CM

## 2025-02-23 DIAGNOSIS — Z87.891 PERSONAL HISTORY OF NICOTINE DEPENDENCE: ICD-10-CM

## 2025-02-23 DIAGNOSIS — M66.822 SPONTANEOUS RUPTURE OF OTHER TENDONS, LEFT UPPER ARM: ICD-10-CM

## 2025-02-26 ENCOUNTER — NON-APPOINTMENT (OUTPATIENT)
Age: 65
End: 2025-02-26

## 2025-03-04 ENCOUNTER — APPOINTMENT (OUTPATIENT)
Dept: ORTHOPEDIC SURGERY | Facility: CLINIC | Age: 65
End: 2025-03-04
Payer: COMMERCIAL

## 2025-03-04 PROCEDURE — 99024 POSTOP FOLLOW-UP VISIT: CPT

## 2025-03-04 PROCEDURE — 73030 X-RAY EXAM OF SHOULDER: CPT | Mod: LT

## 2025-03-12 ENCOUNTER — APPOINTMENT (OUTPATIENT)
Dept: ORTHOPEDIC SURGERY | Facility: CLINIC | Age: 65
End: 2025-03-12
Payer: COMMERCIAL

## 2025-03-12 ENCOUNTER — OUTPATIENT (OUTPATIENT)
Dept: OUTPATIENT SERVICES | Facility: HOSPITAL | Age: 65
LOS: 1 days | End: 2025-03-12

## 2025-03-12 ENCOUNTER — APPOINTMENT (OUTPATIENT)
Dept: MRI IMAGING | Facility: CLINIC | Age: 65
End: 2025-03-12
Payer: COMMERCIAL

## 2025-03-12 DIAGNOSIS — Z98.890 OTHER SPECIFIED POSTPROCEDURAL STATES: Chronic | ICD-10-CM

## 2025-03-12 DIAGNOSIS — Z95.5 PRESENCE OF CORONARY ANGIOPLASTY IMPLANT AND GRAFT: Chronic | ICD-10-CM

## 2025-03-12 DIAGNOSIS — M75.122 COMPLETE ROTATOR CUFF TEAR OR RUPTURE OF LEFT SHOULDER, NOT SPECIFIED AS TRAUMATIC: ICD-10-CM

## 2025-03-12 PROCEDURE — 73221 MRI JOINT UPR EXTREM W/O DYE: CPT | Mod: 26,LT

## 2025-03-12 PROCEDURE — 99024 POSTOP FOLLOW-UP VISIT: CPT

## 2025-03-13 ENCOUNTER — APPOINTMENT (OUTPATIENT)
Dept: ORTHOPEDIC SURGERY | Facility: CLINIC | Age: 65
End: 2025-03-13
Payer: COMMERCIAL

## 2025-03-13 DIAGNOSIS — M75.22 BICIPITAL TENDINITIS, LEFT SHOULDER: ICD-10-CM

## 2025-03-13 DIAGNOSIS — M75.122 COMPLETE ROTATOR CUFF TEAR OR RUPTURE OF LEFT SHOULDER, NOT SPECIFIED AS TRAUMATIC: ICD-10-CM

## 2025-03-13 PROCEDURE — 99024 POSTOP FOLLOW-UP VISIT: CPT

## 2025-03-17 ENCOUNTER — NON-APPOINTMENT (OUTPATIENT)
Age: 65
End: 2025-03-17

## 2025-03-19 ENCOUNTER — NON-APPOINTMENT (OUTPATIENT)
Age: 65
End: 2025-03-19

## 2025-03-20 RX ORDER — ONDANSETRON 4 MG/1
4 TABLET ORAL
Qty: 42 | Refills: 0 | Status: COMPLETED | COMMUNITY
Start: 2025-03-20 | End: 2025-03-27

## 2025-03-20 RX ORDER — HYDROCODONE BITARTRATE AND ACETAMINOPHEN 5; 300 MG/1; MG/1
5-300 TABLET ORAL EVERY 6 HOURS
Qty: 28 | Refills: 0 | Status: ACTIVE | COMMUNITY
Start: 2025-03-20 | End: 1900-01-01

## 2025-03-24 ENCOUNTER — APPOINTMENT (OUTPATIENT)
Dept: ORTHOPEDIC SURGERY | Facility: HOSPITAL | Age: 65
End: 2025-03-24

## 2025-03-24 PROCEDURE — 23430 REPAIR BICEPS TENDON: CPT | Mod: 78,LT

## 2025-03-24 PROCEDURE — 29826 SHO ARTHRS SRG DECOMPRESSION: CPT | Mod: 78,LT

## 2025-03-24 PROCEDURE — 29823 SHO ARTHRS SRG XTNSV DBRDMT: CPT | Mod: 78,LT

## 2025-03-24 PROCEDURE — 29827 SHO ARTHRS SRG RT8TR CUF RPR: CPT | Mod: 78,LT

## 2025-03-31 ENCOUNTER — NON-APPOINTMENT (OUTPATIENT)
Age: 65
End: 2025-03-31

## 2025-04-05 ENCOUNTER — NON-APPOINTMENT (OUTPATIENT)
Age: 65
End: 2025-04-05

## 2025-04-07 ENCOUNTER — APPOINTMENT (OUTPATIENT)
Dept: ORTHOPEDIC SURGERY | Facility: CLINIC | Age: 65
End: 2025-04-07
Payer: COMMERCIAL

## 2025-04-07 VITALS — HEIGHT: 69 IN | WEIGHT: 190 LBS | BODY MASS INDEX: 28.14 KG/M2

## 2025-04-07 DIAGNOSIS — M75.122 COMPLETE ROTATOR CUFF TEAR OR RUPTURE OF LEFT SHOULDER, NOT SPECIFIED AS TRAUMATIC: ICD-10-CM

## 2025-04-07 DIAGNOSIS — M75.22 BICIPITAL TENDINITIS, LEFT SHOULDER: ICD-10-CM

## 2025-04-07 PROCEDURE — 73030 X-RAY EXAM OF SHOULDER: CPT | Mod: LT

## 2025-04-07 PROCEDURE — 99024 POSTOP FOLLOW-UP VISIT: CPT

## 2025-04-07 RX ORDER — MELOXICAM 7.5 MG/1
7.5 TABLET ORAL
Qty: 60 | Refills: 0 | Status: ACTIVE | COMMUNITY
Start: 2025-04-07 | End: 1900-01-01

## 2025-04-07 RX ORDER — CYCLOBENZAPRINE HYDROCHLORIDE 5 MG/1
5 TABLET, FILM COATED ORAL 3 TIMES DAILY
Qty: 21 | Refills: 0 | Status: ACTIVE | COMMUNITY
Start: 2025-04-07 | End: 1900-01-01

## 2025-04-28 ENCOUNTER — NON-APPOINTMENT (OUTPATIENT)
Age: 65
End: 2025-04-28

## 2025-05-07 ENCOUNTER — APPOINTMENT (OUTPATIENT)
Dept: ORTHOPEDIC SURGERY | Facility: CLINIC | Age: 65
End: 2025-05-07
Payer: COMMERCIAL

## 2025-05-07 VITALS — BODY MASS INDEX: 28.14 KG/M2 | HEIGHT: 69 IN | WEIGHT: 190 LBS

## 2025-05-07 DIAGNOSIS — M75.122 COMPLETE ROTATOR CUFF TEAR OR RUPTURE OF LEFT SHOULDER, NOT SPECIFIED AS TRAUMATIC: ICD-10-CM

## 2025-05-07 DIAGNOSIS — M75.22 BICIPITAL TENDINITIS, LEFT SHOULDER: ICD-10-CM

## 2025-05-07 PROCEDURE — 99024 POSTOP FOLLOW-UP VISIT: CPT

## 2025-06-04 ENCOUNTER — APPOINTMENT (OUTPATIENT)
Dept: DERMATOLOGY | Facility: CLINIC | Age: 65
End: 2025-06-04
Payer: COMMERCIAL

## 2025-06-04 PROCEDURE — 99213 OFFICE O/P EST LOW 20 MIN: CPT

## 2025-06-07 ENCOUNTER — NON-APPOINTMENT (OUTPATIENT)
Age: 65
End: 2025-06-07

## 2025-06-12 ENCOUNTER — APPOINTMENT (OUTPATIENT)
Dept: ORTHOPEDIC SURGERY | Facility: CLINIC | Age: 65
End: 2025-06-12

## 2025-06-12 PROBLEM — M18.11 PRIMARY OSTEOARTHRITIS OF FIRST CARPOMETACARPAL JOINT OF RIGHT HAND: Status: ACTIVE | Noted: 2025-06-12

## 2025-06-12 PROCEDURE — 20605 DRAIN/INJ JOINT/BURSA W/O US: CPT | Mod: 79,RT

## 2025-06-12 PROCEDURE — 99213 OFFICE O/P EST LOW 20 MIN: CPT | Mod: 25

## 2025-06-12 PROCEDURE — 73130 X-RAY EXAM OF HAND: CPT | Mod: RT

## 2025-06-16 ENCOUNTER — APPOINTMENT (OUTPATIENT)
Dept: ORTHOPEDIC SURGERY | Facility: CLINIC | Age: 65
End: 2025-06-16
Payer: COMMERCIAL

## 2025-06-16 VITALS — HEIGHT: 69 IN | BODY MASS INDEX: 28.14 KG/M2 | WEIGHT: 190 LBS

## 2025-06-16 PROCEDURE — 99024 POSTOP FOLLOW-UP VISIT: CPT

## 2025-07-08 ENCOUNTER — OFFICE (OUTPATIENT)
Dept: URBAN - METROPOLITAN AREA CLINIC 104 | Facility: CLINIC | Age: 65
Setting detail: OPHTHALMOLOGY
End: 2025-07-08
Payer: COMMERCIAL

## 2025-07-08 DIAGNOSIS — H35.033: ICD-10-CM

## 2025-07-08 DIAGNOSIS — H01.002: ICD-10-CM

## 2025-07-08 DIAGNOSIS — H01.001: ICD-10-CM

## 2025-07-08 DIAGNOSIS — H01.005: ICD-10-CM

## 2025-07-08 DIAGNOSIS — H01.004: ICD-10-CM

## 2025-07-08 DIAGNOSIS — H35.412: ICD-10-CM

## 2025-07-08 DIAGNOSIS — H25.13: ICD-10-CM

## 2025-07-08 PROCEDURE — 92014 COMPRE OPH EXAM EST PT 1/>: CPT | Performed by: OPTOMETRIST

## 2025-07-08 PROCEDURE — 92250 FUNDUS PHOTOGRAPHY W/I&R: CPT | Performed by: OPTOMETRIST

## 2025-07-08 ASSESSMENT — REFRACTION_CURRENTRX
OS_SPHERE: -1.00
OD_ADD: +1.75
OD_SPHERE: PLANO
OD_AXIS: 105
OD_CYLINDER: -1.00
OS_AXIS: 65
OS_ADD: +2.25
OS_OVR_VA: 20/
OS_CYLINDER: -0.75
OD_OVR_VA: 20/

## 2025-07-08 ASSESSMENT — VISUAL ACUITY
OS_BCVA: 20/20-2
OD_BCVA: 20/30-1

## 2025-07-08 ASSESSMENT — KERATOMETRY
OS_AXISANGLE_DEGREES: 144
OD_K2POWER_DIOPTERS: 45.24
OS_K2POWER_DIOPTERS: 45.12
OD_K1POWER_DIOPTERS: 44.29
OS_K1POWER_DIOPTERS: 43.95
OD_AXISANGLE_DEGREES: 22

## 2025-07-08 ASSESSMENT — REFRACTION_AUTOREFRACTION
OS_SPHERE: 0.00
OD_AXIS: 111
OS_AXIS: 62
OS_CYLINDER: -1.75
OD_CYLINDER: -1.50
OD_SPHERE: +1.00

## 2025-07-08 ASSESSMENT — LID EXAM ASSESSMENTS
OD_BLEPHARITIS: RLL RUL 1+ 2+
OS_BLEPHARITIS: LLL LUL 1+ 2+

## 2025-07-08 ASSESSMENT — CONFRONTATIONAL VISUAL FIELD TEST (CVF)
OD_FINDINGS: FULL
OS_FINDINGS: FULL

## 2025-07-19 ENCOUNTER — NON-APPOINTMENT (OUTPATIENT)
Age: 65
End: 2025-07-19

## 2025-08-08 ENCOUNTER — RX RENEWAL (OUTPATIENT)
Age: 65
End: 2025-08-08

## 2025-08-15 ENCOUNTER — OFFICE (OUTPATIENT)
Dept: URBAN - METROPOLITAN AREA CLINIC 104 | Facility: CLINIC | Age: 65
Setting detail: OPHTHALMOLOGY
End: 2025-08-15
Payer: COMMERCIAL

## 2025-08-15 DIAGNOSIS — H01.005: ICD-10-CM

## 2025-08-15 DIAGNOSIS — H52.4: ICD-10-CM

## 2025-08-15 DIAGNOSIS — H25.13: ICD-10-CM

## 2025-08-15 DIAGNOSIS — H01.001: ICD-10-CM

## 2025-08-15 DIAGNOSIS — H01.004: ICD-10-CM

## 2025-08-15 DIAGNOSIS — H01.002: ICD-10-CM

## 2025-08-15 PROBLEM — G43.109 OCULAR MIGRAINE-W/ AURA: Status: ACTIVE | Noted: 2025-08-15

## 2025-08-15 PROCEDURE — 92015 DETERMINE REFRACTIVE STATE: CPT | Performed by: OPTOMETRIST

## 2025-08-15 PROCEDURE — 99213 OFFICE O/P EST LOW 20 MIN: CPT | Performed by: OPTOMETRIST

## 2025-08-15 ASSESSMENT — REFRACTION_MANIFEST
OD_VA1: 20/20
OD_ADD: +2.25
OD_AXIS: 115
OS_CYLINDER: -1.75
OS_ADD: +2.25
OD_SPHERE: +0.50
OS_AXIS: 045
OD_CYLINDER: -1.25
OS_SPHERE: -0.75
OS_VA1: 20/20

## 2025-08-15 ASSESSMENT — REFRACTION_AUTOREFRACTION
OD_SPHERE: +1.00
OD_AXIS: 106
OS_AXIS: 051
OD_CYLINDER: -1.25
OS_SPHERE: PLANO
OS_CYLINDER: -2.00

## 2025-08-15 ASSESSMENT — REFRACTION_CURRENTRX
OS_OVR_VA: 20/
OS_SPHERE: -1.00
OD_CYLINDER: -1.00
OD_SPHERE: PLANO
OD_OVR_VA: 20/
OS_ADD: +2.25
OD_ADD: +2.25
OS_CYLINDER: -0.75
OD_AXIS: 109
OS_AXIS: 065

## 2025-08-15 ASSESSMENT — KERATOMETRY
OD_K2POWER_DIOPTERS: 45.24
OS_K1POWER_DIOPTERS: 44.00
OD_K1POWER_DIOPTERS: 44.47
OD_AXISANGLE_DEGREES: 028
OS_K2POWER_DIOPTERS: 45.36
OS_AXISANGLE_DEGREES: 136

## 2025-08-15 ASSESSMENT — LID EXAM ASSESSMENTS
OD_BLEPHARITIS: RLL RUL 1+ 2+
OS_BLEPHARITIS: LLL LUL 1+ 2+

## 2025-08-15 ASSESSMENT — VISUAL ACUITY
OD_BCVA: 20/30-2
OS_BCVA: 20/20-2

## 2025-08-15 ASSESSMENT — CONFRONTATIONAL VISUAL FIELD TEST (CVF)
OS_FINDINGS: FULL
OD_FINDINGS: FULL

## 2025-08-18 ENCOUNTER — APPOINTMENT (OUTPATIENT)
Dept: ORTHOPEDIC SURGERY | Facility: CLINIC | Age: 65
End: 2025-08-18
Payer: COMMERCIAL

## 2025-08-18 VITALS — HEIGHT: 69 IN | BODY MASS INDEX: 28.14 KG/M2 | WEIGHT: 190 LBS

## 2025-08-18 DIAGNOSIS — M75.122 COMPLETE ROTATOR CUFF TEAR OR RUPTURE OF LEFT SHOULDER, NOT SPECIFIED AS TRAUMATIC: ICD-10-CM

## 2025-08-18 DIAGNOSIS — M75.22 BICIPITAL TENDINITIS, LEFT SHOULDER: ICD-10-CM

## 2025-08-18 PROCEDURE — 99214 OFFICE O/P EST MOD 30 MIN: CPT

## 2025-08-21 ENCOUNTER — NON-APPOINTMENT (OUTPATIENT)
Age: 65
End: 2025-08-21

## 2025-09-08 ENCOUNTER — RX RENEWAL (OUTPATIENT)
Age: 65
End: 2025-09-08

## 2025-09-11 ENCOUNTER — APPOINTMENT (OUTPATIENT)
Dept: CT IMAGING | Facility: CLINIC | Age: 65
End: 2025-09-11
Payer: COMMERCIAL

## 2025-09-11 PROCEDURE — 74263 CT COLONOGRAPHY SCREENING: CPT | Mod: 26

## (undated) DEVICE — Device

## (undated) DEVICE — SUT POLYSORB 3-0 30" V-20 UNDYED

## (undated) DEVICE — DRSG OPSITE 2.5 X 2"

## (undated) DEVICE — DRSG KLING 6"

## (undated) DEVICE — GOWN TRIMAX LG

## (undated) DEVICE — PREP CHLORAPREP HI-LITE ORANGE 26ML

## (undated) DEVICE — SUT PROLENE 4-0 30" SH-1

## (undated) DEVICE — STOPCOCK 4-WAY (BLUE) DISCOFIX SPIN-LOCK CONNECTOR

## (undated) DEVICE — PACING CABLE (BLUE) ATRIAL TEMP SCREW DOWN 12FT

## (undated) DEVICE — SYR LUER LOK 20CC

## (undated) DEVICE — DRSG OPSITE 13.75 X 4"

## (undated) DEVICE — DRAIN CHANNEL 32FR ROUND HUBLESS FULL FLUTED

## (undated) DEVICE — MEDTRONIC URCHIN EVO HEART POSITIONER & CANISTER TUBING SET

## (undated) DEVICE — PACK CUSTOM W/INSPIRE OXYGENATOR

## (undated) DEVICE — BEAVER BLADE MIN-BLADE ROUNDED TIP 1-SIDE SHARP (GREEN)

## (undated) DEVICE — GLV 7.5 PROTEXIS (WHITE)

## (undated) DEVICE — SOL IRR POUR NS 0.9% 500ML

## (undated) DEVICE — SUT MONOCRYL 4-0 18" PS-2

## (undated) DEVICE — MEDTRONIC CLEARVIEW BLOWER MISTER KIT W TUBING SET

## (undated) DEVICE — DRSG DERMABOND PRINEO 22CM

## (undated) DEVICE — SAW BLADE MICROAIRE STERNUM 1X34X9.4MM

## (undated) DEVICE — SOL IRR POUR H2O 250ML

## (undated) DEVICE — FEEDING TUBE NG SUMP 16FR 48"

## (undated) DEVICE — SUT ETHIBOND 1 30" CT-1

## (undated) DEVICE — CONNECTOR STRAIGHT 3/8 X 1/2"

## (undated) DEVICE — SUT VICRYL 3-0 27" CT-1

## (undated) DEVICE — CONNECTOR CARDIAC 1:1 FOR HUBLESS DRAINS

## (undated) DEVICE — CHEST DRAIN PLEUR-EVAC DRY/WET ADULT-PEDS DUAL (QUICK)

## (undated) DEVICE — SUT SOFSILK 0 18" TIES

## (undated) DEVICE — DRAPE 3/4 SHEET W REINFORCEMENT 56X77"

## (undated) DEVICE — NDL COUNTER FOAM AND MAGNET 40-70

## (undated) DEVICE — CANISTER SUCTION 2000CC

## (undated) DEVICE — SYR LUER LOK 50CC

## (undated) DEVICE — DRSG CURITY GAUZE SPONGE 4 X 4" 12-PLY

## (undated) DEVICE — AORTIC PUNCH 4.0MM LONG LENGTH HANDLE

## (undated) DEVICE — STAPLER SKIN VISI-STAT 35 WIDE

## (undated) DEVICE — DRSG STERISTRIPS 0.5 X 4"

## (undated) DEVICE — SUT VICRYL 0 36" CTX UNDYED

## (undated) DEVICE — GOWN TRIMAX XXL

## (undated) DEVICE — PACK UNIVERSAL CARDIAC

## (undated) DEVICE — SOL NORMOSOL-R PH7.4 1000ML

## (undated) DEVICE — PACING CABLE (BROWN) A/V TEMP SCREW DOWN 12FT

## (undated) DEVICE — LAP PAD 18 X 18"

## (undated) DEVICE — STABILIZER HAND ASSISTANT ATTACHMENT W STABLESOFT 2S

## (undated) DEVICE — SUT BOOT STANDARD (ASSORTED) 5 PAIR

## (undated) DEVICE — GLV 8 PROTEXIS (WHITE)

## (undated) DEVICE — WARMING BLANKET FULL ADULT

## (undated) DEVICE — NDL HYPO SAFE 18G X 1.5" (PINK)

## (undated) DEVICE — CONNECTOR STRAIGHT 3/8 X 3/8"

## (undated) DEVICE — SYR LUER LOK 30CC

## (undated) DEVICE — DRSG DERMABOND PRINEO 60CM

## (undated) DEVICE — DRAPE MAYO STAND 30"

## (undated) DEVICE — GLV 7 PROTEXIS (WHITE)

## (undated) DEVICE — SYNOVIS VASCULAR PROBE 1.5MM 15CM

## (undated) DEVICE — SUT VICRYL 1 36" CTX UNDYED

## (undated) DEVICE — DRSG TEGADERM 4X4.75"

## (undated) DEVICE — DRAPE IOBAN 33" X 23"

## (undated) DEVICE — POSITIONER FOAM EGG CRATE ULNAR 2PCS (PINK)

## (undated) DEVICE — GOWN SLEEVES

## (undated) DEVICE — DRAIN RESERVOIR FOR JACKSON PRATT 100CC CARDINAL

## (undated) DEVICE — BULLDOG SPRING CLIP LATIS/LATIS 6MM 1/2 FORCE (BLUE)

## (undated) DEVICE — SUT PROLENE 7-0 24" BV-1

## (undated) DEVICE — BLADE SCALPEL SAFETYLOCK #15

## (undated) DEVICE — SYR LUER LOK 1CC

## (undated) DEVICE — POSITIONER CARDIAC BUMP

## (undated) DEVICE — PACK CARDIAC YELLOW

## (undated) DEVICE — SPECIMEN CONTAINER 100ML

## (undated) DEVICE — TOURNIQUET SET 12FR (1 RED, 1 BLUE, 1 SNARE) 7"

## (undated) DEVICE — BEAVER BLADE MINI SHARP ALL ROUND (BLUE)

## (undated) DEVICE — SUT PROLENE 8-0 24" BV175-6

## (undated) DEVICE — VESSEL LOOP MAXI-RED  0.120" X 16"

## (undated) DEVICE — SUT SILK 2-0 18" SH (POP-OFF)

## (undated) DEVICE — DRSG TEGADERM 6"X8"

## (undated) DEVICE — GLV 6.5 PROTEXIS (WHITE)

## (undated) DEVICE — SUT STAINLESS STEEL 5 18" CCS

## (undated) DEVICE — GETINGE VASOVIEW 7 ENDOSCOPIC VESSEL HARVESTING SYSTEM

## (undated) DEVICE — SUT PLEDGET SOFT LARGE 3/8" X 3/16" X 1/16" X6

## (undated) DEVICE — SUT SILK 5-0 60" TIES

## (undated) DEVICE — TUBING SUCTION 20FT

## (undated) DEVICE — SUCTION YANKAUER BULBOUS TIP NO VENT

## (undated) DEVICE — SYS VEIN HARVESTING VIRTUOSAPH PLUS W/ RADIAL

## (undated) DEVICE — GOWN LG

## (undated) DEVICE — SUT DOUBLE 6 WIRE STERNAL

## (undated) DEVICE — FOLEY TRAY 16FR 5CC LF LUBRISIL ADVANCE TEMP CLOSED

## (undated) DEVICE — SUT BOOT STANDARD (YELLOW) 5 PAIR

## (undated) DEVICE — BLADE SCALPEL SAFETYLOCK #10

## (undated) DEVICE — DRAPE 1/2 SHEET 40X57"

## (undated) DEVICE — SAW BLADE SYNVASIVE OSCILLATING

## (undated) DEVICE — SUT SOFSILK 0 30" TIES

## (undated) DEVICE — DRAIN PLEUROVAC

## (undated) DEVICE — SUMP PERICARDIAL 20FR 1/4" ADULT

## (undated) DEVICE — CHEST DRAIN PLEUR-EVAC DRY/WET ADULT-PEDS SINGLE (QUICK)

## (undated) DEVICE — GLV 8.5 PROTEXIS (WHITE)

## (undated) DEVICE — DRAPE TOWEL BLUE 17" X 24"

## (undated) DEVICE — BLADE SCALPEL SAFETYLOCK #11

## (undated) DEVICE — TISSUE STABILIZER MEDTRONIC OCTOPUS EVOLUTION

## (undated) DEVICE — SUT PROLENE 6-0 30" C-1

## (undated) DEVICE — SOL IRR BAG NS 0.9% 3000ML

## (undated) DEVICE — CONNECTOR "Y" 3/8 X 1/4 X 3/8"

## (undated) DEVICE — VESSEL LOOP KEY SURG MAXI BLUE 2.4X1.2MM

## (undated) DEVICE — SAW BLADE MICROAIRE OSCILLATING LG 0.9X64X33.5MM

## (undated) DEVICE — VENTING ADAPTER "Y" (RED/BLUE) 7.5"

## (undated) DEVICE — DRSG ACE BANDAGE 6"